# Patient Record
Sex: FEMALE | Race: ASIAN | NOT HISPANIC OR LATINO
[De-identification: names, ages, dates, MRNs, and addresses within clinical notes are randomized per-mention and may not be internally consistent; named-entity substitution may affect disease eponyms.]

---

## 2017-01-10 ENCOUNTER — APPOINTMENT (OUTPATIENT)
Dept: ALLERGY | Facility: CLINIC | Age: 37
End: 2017-01-10

## 2017-01-10 VITALS
SYSTOLIC BLOOD PRESSURE: 110 MMHG | DIASTOLIC BLOOD PRESSURE: 74 MMHG | HEIGHT: 64 IN | WEIGHT: 170 LBS | BODY MASS INDEX: 29.02 KG/M2 | HEART RATE: 72 BPM | RESPIRATION RATE: 14 BRPM

## 2017-02-06 ENCOUNTER — APPOINTMENT (OUTPATIENT)
Dept: ALLERGY | Facility: CLINIC | Age: 37
End: 2017-02-06

## 2017-02-06 RX ORDER — CETIRIZINE HCL 10 MG
10 TABLET ORAL
Refills: 0 | Status: DISCONTINUED | COMMUNITY
End: 2017-02-06

## 2017-03-07 ENCOUNTER — MESSAGE (OUTPATIENT)
Age: 37
End: 2017-03-07

## 2017-03-13 ENCOUNTER — APPOINTMENT (OUTPATIENT)
Dept: DERMATOLOGY | Facility: CLINIC | Age: 37
End: 2017-03-13

## 2017-03-13 VITALS — DIASTOLIC BLOOD PRESSURE: 60 MMHG | SYSTOLIC BLOOD PRESSURE: 100 MMHG

## 2017-03-13 RX ORDER — RANITIDINE HYDROCHLORIDE 150 MG/1
150 TABLET, FILM COATED ORAL
Refills: 0 | Status: COMPLETED | COMMUNITY
End: 2017-03-13

## 2017-03-20 ENCOUNTER — APPOINTMENT (OUTPATIENT)
Dept: ALLERGY | Facility: CLINIC | Age: 37
End: 2017-03-20

## 2017-03-24 ENCOUNTER — APPOINTMENT (OUTPATIENT)
Dept: INTERNAL MEDICINE | Facility: CLINIC | Age: 37
End: 2017-03-24

## 2017-03-24 VITALS
HEART RATE: 101 BPM | SYSTOLIC BLOOD PRESSURE: 100 MMHG | HEIGHT: 64 IN | BODY MASS INDEX: 29.53 KG/M2 | OXYGEN SATURATION: 98 % | DIASTOLIC BLOOD PRESSURE: 60 MMHG | WEIGHT: 173 LBS | TEMPERATURE: 98.5 F

## 2017-03-27 ENCOUNTER — APPOINTMENT (OUTPATIENT)
Dept: INTERNAL MEDICINE | Facility: CLINIC | Age: 37
End: 2017-03-27

## 2017-03-27 VITALS
OXYGEN SATURATION: 98 % | BODY MASS INDEX: 29.53 KG/M2 | TEMPERATURE: 98.6 F | WEIGHT: 173 LBS | HEART RATE: 109 BPM | DIASTOLIC BLOOD PRESSURE: 70 MMHG | SYSTOLIC BLOOD PRESSURE: 114 MMHG | HEIGHT: 64 IN

## 2017-03-27 LAB — S PYO AG SPEC QL IA: POSITIVE

## 2017-03-28 LAB
ALBUMIN SERPL ELPH-MCNC: 4.3 G/DL
ALP BLD-CCNC: 67 U/L
ALT SERPL-CCNC: 15 U/L
ANION GAP SERPL CALC-SCNC: 14 MMOL/L
AST SERPL-CCNC: 17 U/L
BASOPHILS # BLD AUTO: 0.03 K/UL
BASOPHILS NFR BLD AUTO: 0.3 %
BILIRUB SERPL-MCNC: 0.3 MG/DL
BUN SERPL-MCNC: 14 MG/DL
CALCIUM SERPL-MCNC: 10.3 MG/DL
CHLORIDE SERPL-SCNC: 102 MMOL/L
CHOLEST SERPL-MCNC: 175 MG/DL
CHOLEST/HDLC SERPL: 4.4 RATIO
CO2 SERPL-SCNC: 24 MMOL/L
CREAT SERPL-MCNC: 0.79 MG/DL
EOSINOPHIL # BLD AUTO: 0.04 K/UL
EOSINOPHIL NFR BLD AUTO: 0.5 %
GLUCOSE SERPL-MCNC: 95 MG/DL
HBA1C MFR BLD HPLC: 5.9 %
HCT VFR BLD CALC: 36.6 %
HDLC SERPL-MCNC: 40 MG/DL
HGB BLD-MCNC: 11.8 G/DL
IMM GRANULOCYTES NFR BLD AUTO: 0.1 %
LDLC SERPL CALC-MCNC: 86 MG/DL
LYMPHOCYTES # BLD AUTO: 2.3 K/UL
LYMPHOCYTES NFR BLD AUTO: 26.2 %
MAN DIFF?: NORMAL
MCHC RBC-ENTMCNC: 27.6 PG
MCHC RBC-ENTMCNC: 32.2 GM/DL
MCV RBC AUTO: 85.5 FL
MONOCYTES # BLD AUTO: 0.45 K/UL
MONOCYTES NFR BLD AUTO: 5.1 %
NEUTROPHILS # BLD AUTO: 5.94 K/UL
NEUTROPHILS NFR BLD AUTO: 67.8 %
PLATELET # BLD AUTO: 278 K/UL
POTASSIUM SERPL-SCNC: 4.6 MMOL/L
PROT SERPL-MCNC: 7.3 G/DL
RBC # BLD: 4.28 M/UL
RBC # FLD: 15.5 %
SODIUM SERPL-SCNC: 140 MMOL/L
TRIGL SERPL-MCNC: 245 MG/DL
TSH SERPL-ACNC: 1.52 UIU/ML
WBC # FLD AUTO: 8.77 K/UL

## 2017-04-11 ENCOUNTER — APPOINTMENT (OUTPATIENT)
Dept: INTERNAL MEDICINE | Facility: CLINIC | Age: 37
End: 2017-04-11

## 2017-04-11 VITALS
DIASTOLIC BLOOD PRESSURE: 80 MMHG | SYSTOLIC BLOOD PRESSURE: 122 MMHG | BODY MASS INDEX: 29.53 KG/M2 | HEART RATE: 92 BPM | OXYGEN SATURATION: 98 % | HEIGHT: 64 IN | TEMPERATURE: 98.1 F | WEIGHT: 173 LBS

## 2017-04-11 RX ORDER — AZITHROMYCIN 250 MG/1
250 TABLET, FILM COATED ORAL
Qty: 1 | Refills: 0 | Status: DISCONTINUED | COMMUNITY
Start: 2017-03-27 | End: 2017-04-11

## 2017-04-13 LAB — BACTERIA THROAT CULT: NORMAL

## 2017-04-19 ENCOUNTER — APPOINTMENT (OUTPATIENT)
Dept: OBGYN | Facility: CLINIC | Age: 37
End: 2017-04-19

## 2017-04-19 VITALS
SYSTOLIC BLOOD PRESSURE: 111 MMHG | WEIGHT: 174 LBS | BODY MASS INDEX: 29.71 KG/M2 | DIASTOLIC BLOOD PRESSURE: 70 MMHG | HEART RATE: 82 BPM | HEIGHT: 64 IN

## 2017-04-21 ENCOUNTER — OTHER (OUTPATIENT)
Age: 37
End: 2017-04-21

## 2017-04-26 ENCOUNTER — OUTPATIENT (OUTPATIENT)
Dept: OUTPATIENT SERVICES | Facility: HOSPITAL | Age: 37
LOS: 1 days | End: 2017-04-26
Payer: COMMERCIAL

## 2017-04-26 ENCOUNTER — APPOINTMENT (OUTPATIENT)
Dept: ULTRASOUND IMAGING | Facility: CLINIC | Age: 37
End: 2017-04-26

## 2017-04-26 ENCOUNTER — APPOINTMENT (OUTPATIENT)
Dept: MAMMOGRAPHY | Facility: CLINIC | Age: 37
End: 2017-04-26

## 2017-04-26 DIAGNOSIS — Z98.89 OTHER SPECIFIED POSTPROCEDURAL STATES: Chronic | ICD-10-CM

## 2017-04-26 DIAGNOSIS — R10.32 LEFT LOWER QUADRANT PAIN: ICD-10-CM

## 2017-04-26 DIAGNOSIS — Z01.419 ENCOUNTER FOR GYNECOLOGICAL EXAMINATION (GENERAL) (ROUTINE) WITHOUT ABNORMAL FINDINGS: ICD-10-CM

## 2017-04-26 PROCEDURE — 77066 DX MAMMO INCL CAD BI: CPT

## 2017-04-26 PROCEDURE — 76830 TRANSVAGINAL US NON-OB: CPT

## 2017-04-26 PROCEDURE — 76856 US EXAM PELVIC COMPLETE: CPT

## 2017-04-26 PROCEDURE — 76642 ULTRASOUND BREAST LIMITED: CPT

## 2017-04-26 PROCEDURE — G0279: CPT

## 2017-05-03 ENCOUNTER — OTHER (OUTPATIENT)
Age: 37
End: 2017-05-03

## 2017-05-08 ENCOUNTER — APPOINTMENT (OUTPATIENT)
Dept: DERMATOLOGY | Facility: CLINIC | Age: 37
End: 2017-05-08

## 2017-05-09 ENCOUNTER — OUTPATIENT (OUTPATIENT)
Dept: OUTPATIENT SERVICES | Facility: HOSPITAL | Age: 37
LOS: 1 days | End: 2017-05-09
Payer: COMMERCIAL

## 2017-05-09 ENCOUNTER — APPOINTMENT (OUTPATIENT)
Dept: MAMMOGRAPHY | Facility: CLINIC | Age: 37
End: 2017-05-09

## 2017-05-09 DIAGNOSIS — Z98.89 OTHER SPECIFIED POSTPROCEDURAL STATES: Chronic | ICD-10-CM

## 2017-05-09 DIAGNOSIS — R92.8 OTHER ABNORMAL AND INCONCLUSIVE FINDINGS ON DIAGNOSTIC IMAGING OF BREAST: ICD-10-CM

## 2017-05-09 PROCEDURE — 19081 BX BREAST 1ST LESION STRTCTC: CPT

## 2017-05-10 ENCOUNTER — CLINICAL ADVICE (OUTPATIENT)
Age: 37
End: 2017-05-10

## 2017-05-11 ENCOUNTER — APPOINTMENT (OUTPATIENT)
Dept: DERMATOLOGY | Facility: CLINIC | Age: 37
End: 2017-05-11

## 2017-05-11 VITALS — SYSTOLIC BLOOD PRESSURE: 110 MMHG | DIASTOLIC BLOOD PRESSURE: 58 MMHG

## 2017-06-21 ENCOUNTER — APPOINTMENT (OUTPATIENT)
Dept: INTERNAL MEDICINE | Facility: CLINIC | Age: 37
End: 2017-06-21

## 2017-06-21 ENCOUNTER — TRANSCRIPTION ENCOUNTER (OUTPATIENT)
Age: 37
End: 2017-06-21

## 2017-06-21 VITALS
WEIGHT: 170 LBS | HEIGHT: 64 IN | TEMPERATURE: 99 F | DIASTOLIC BLOOD PRESSURE: 70 MMHG | SYSTOLIC BLOOD PRESSURE: 120 MMHG | OXYGEN SATURATION: 98 % | HEART RATE: 81 BPM | BODY MASS INDEX: 29.02 KG/M2

## 2017-06-23 ENCOUNTER — APPOINTMENT (OUTPATIENT)
Dept: INTERNAL MEDICINE | Facility: CLINIC | Age: 37
End: 2017-06-23

## 2017-06-23 VITALS
WEIGHT: 170 LBS | SYSTOLIC BLOOD PRESSURE: 112 MMHG | DIASTOLIC BLOOD PRESSURE: 70 MMHG | HEART RATE: 77 BPM | OXYGEN SATURATION: 99 % | HEIGHT: 64 IN | BODY MASS INDEX: 29.02 KG/M2 | TEMPERATURE: 98 F

## 2017-06-23 VITALS — SYSTOLIC BLOOD PRESSURE: 140 MMHG | DIASTOLIC BLOOD PRESSURE: 88 MMHG

## 2017-06-23 RX ORDER — CEPHALEXIN 500 MG/1
500 TABLET ORAL
Qty: 20 | Refills: 0 | Status: DISCONTINUED | COMMUNITY
Start: 2017-06-21 | End: 2017-06-23

## 2017-06-23 RX ORDER — CEPHALEXIN 500 MG/1
500 CAPSULE ORAL
Qty: 20 | Refills: 0 | Status: DISCONTINUED | COMMUNITY
Start: 2017-06-21

## 2017-06-23 RX ORDER — METRONIDAZOLE 500 MG/1
500 TABLET ORAL
Qty: 21 | Refills: 0 | Status: DISCONTINUED | COMMUNITY
Start: 2017-06-22

## 2017-06-30 ENCOUNTER — APPOINTMENT (OUTPATIENT)
Dept: INTERNAL MEDICINE | Facility: CLINIC | Age: 37
End: 2017-06-30

## 2017-06-30 VITALS
SYSTOLIC BLOOD PRESSURE: 110 MMHG | WEIGHT: 170 LBS | HEIGHT: 64 IN | OXYGEN SATURATION: 97 % | HEART RATE: 83 BPM | TEMPERATURE: 97.9 F | DIASTOLIC BLOOD PRESSURE: 64 MMHG | BODY MASS INDEX: 29.02 KG/M2

## 2017-07-05 LAB — BACTERIA THROAT CULT: NORMAL

## 2017-08-25 ENCOUNTER — APPOINTMENT (OUTPATIENT)
Dept: OBGYN | Facility: CLINIC | Age: 37
End: 2017-08-25
Payer: COMMERCIAL

## 2017-08-25 LAB
BILIRUB UR QL STRIP: NEGATIVE
CLARITY UR: CLEAR
COLLECTION METHOD: NORMAL
GLUCOSE UR-MCNC: NEGATIVE
HCG UR QL: 0.2 EU/DL
HGB UR QL STRIP.AUTO: NORMAL
KETONES UR-MCNC: NEGATIVE
LEUKOCYTE ESTERASE UR QL STRIP: NEGATIVE
NITRITE UR QL STRIP: NEGATIVE
PH UR STRIP: 6
PROT UR STRIP-MCNC: NEGATIVE
SP GR UR STRIP: 1

## 2017-08-25 PROCEDURE — 99211 OFF/OP EST MAY X REQ PHY/QHP: CPT

## 2017-08-25 PROCEDURE — 81003 URINALYSIS AUTO W/O SCOPE: CPT | Mod: QW

## 2017-08-27 LAB — BACTERIA UR CULT: NORMAL

## 2017-08-28 ENCOUNTER — APPOINTMENT (OUTPATIENT)
Dept: OBGYN | Facility: CLINIC | Age: 37
End: 2017-08-28
Payer: COMMERCIAL

## 2017-08-28 PROCEDURE — 76830 TRANSVAGINAL US NON-OB: CPT

## 2017-12-04 ENCOUNTER — APPOINTMENT (OUTPATIENT)
Dept: OBGYN | Facility: CLINIC | Age: 37
End: 2017-12-04
Payer: COMMERCIAL

## 2017-12-04 ENCOUNTER — APPOINTMENT (OUTPATIENT)
Dept: OBGYN | Facility: CLINIC | Age: 37
End: 2017-12-04

## 2017-12-04 ENCOUNTER — ASOB RESULT (OUTPATIENT)
Age: 37
End: 2017-12-04

## 2017-12-04 VITALS
DIASTOLIC BLOOD PRESSURE: 77 MMHG | WEIGHT: 168 LBS | HEART RATE: 90 BPM | SYSTOLIC BLOOD PRESSURE: 122 MMHG | HEIGHT: 64 IN | BODY MASS INDEX: 28.68 KG/M2

## 2017-12-04 PROCEDURE — 99214 OFFICE O/P EST MOD 30 MIN: CPT

## 2017-12-04 PROCEDURE — 76830 TRANSVAGINAL US NON-OB: CPT

## 2017-12-04 RX ORDER — DAPSONE 75 MG/G
7.5 GEL TOPICAL
Qty: 1 | Refills: 1 | Status: DISCONTINUED | COMMUNITY
Start: 2017-03-13 | End: 2017-12-04

## 2017-12-04 RX ORDER — ADAPALENE AND BENZOYL PEROXIDE 1; 25 MG/G; MG/G
0.1-2.5 GEL TOPICAL
Qty: 1 | Refills: 5 | Status: DISCONTINUED | COMMUNITY
Start: 2017-03-13 | End: 2017-12-04

## 2017-12-06 ENCOUNTER — OTHER (OUTPATIENT)
Age: 37
End: 2017-12-06

## 2017-12-06 LAB
BACTERIA UR CULT: NORMAL
C TRACH RRNA SPEC QL NAA+PROBE: NOT DETECTED
CANDIDA VAG CYTO: NOT DETECTED
G VAGINALIS+PREV SP MTYP VAG QL MICRO: NOT DETECTED
N GONORRHOEA RRNA SPEC QL NAA+PROBE: NOT DETECTED
SOURCE AMPLIFICATION: NORMAL
T VAGINALIS VAG QL WET PREP: NOT DETECTED

## 2017-12-13 ENCOUNTER — APPOINTMENT (OUTPATIENT)
Dept: INTERNAL MEDICINE | Facility: CLINIC | Age: 37
End: 2017-12-13

## 2018-01-22 ENCOUNTER — APPOINTMENT (OUTPATIENT)
Dept: INTERNAL MEDICINE | Facility: CLINIC | Age: 38
End: 2018-01-22
Payer: COMMERCIAL

## 2018-01-22 VITALS
SYSTOLIC BLOOD PRESSURE: 115 MMHG | HEIGHT: 64 IN | OXYGEN SATURATION: 97 % | WEIGHT: 170 LBS | TEMPERATURE: 97.5 F | BODY MASS INDEX: 29.02 KG/M2 | DIASTOLIC BLOOD PRESSURE: 70 MMHG | HEART RATE: 74 BPM

## 2018-01-22 PROCEDURE — 99213 OFFICE O/P EST LOW 20 MIN: CPT

## 2018-02-01 ENCOUNTER — APPOINTMENT (OUTPATIENT)
Dept: INTERNAL MEDICINE | Facility: CLINIC | Age: 38
End: 2018-02-01
Payer: COMMERCIAL

## 2018-02-01 ENCOUNTER — NON-APPOINTMENT (OUTPATIENT)
Age: 38
End: 2018-02-01

## 2018-02-01 VITALS
HEART RATE: 86 BPM | SYSTOLIC BLOOD PRESSURE: 114 MMHG | WEIGHT: 170 LBS | DIASTOLIC BLOOD PRESSURE: 66 MMHG | OXYGEN SATURATION: 98 % | HEIGHT: 64 IN | BODY MASS INDEX: 29.02 KG/M2 | TEMPERATURE: 97.7 F

## 2018-02-01 DIAGNOSIS — Z82.49 FAMILY HISTORY OF ISCHEMIC HEART DISEASE AND OTHER DISEASES OF THE CIRCULATORY SYSTEM: ICD-10-CM

## 2018-02-01 DIAGNOSIS — Z83.3 FAMILY HISTORY OF DIABETES MELLITUS: ICD-10-CM

## 2018-02-01 PROCEDURE — 93000 ELECTROCARDIOGRAM COMPLETE: CPT

## 2018-02-01 PROCEDURE — 99213 OFFICE O/P EST LOW 20 MIN: CPT | Mod: 25

## 2018-02-02 ENCOUNTER — APPOINTMENT (OUTPATIENT)
Dept: PULMONOLOGY | Facility: CLINIC | Age: 38
End: 2018-02-02
Payer: COMMERCIAL

## 2018-02-02 PROCEDURE — 94060 EVALUATION OF WHEEZING: CPT

## 2018-02-02 PROCEDURE — 94726 PLETHYSMOGRAPHY LUNG VOLUMES: CPT

## 2018-02-02 PROCEDURE — 94729 DIFFUSING CAPACITY: CPT

## 2018-04-04 ENCOUNTER — EMERGENCY (EMERGENCY)
Facility: HOSPITAL | Age: 38
LOS: 1 days | Discharge: HOME | End: 2018-04-04
Attending: EMERGENCY MEDICINE | Admitting: EMERGENCY MEDICINE
Payer: COMMERCIAL

## 2018-04-04 VITALS
SYSTOLIC BLOOD PRESSURE: 113 MMHG | DIASTOLIC BLOOD PRESSURE: 65 MMHG | HEART RATE: 90 BPM | TEMPERATURE: 98 F | RESPIRATION RATE: 16 BRPM | OXYGEN SATURATION: 100 %

## 2018-04-04 DIAGNOSIS — Z98.89 OTHER SPECIFIED POSTPROCEDURAL STATES: Chronic | ICD-10-CM

## 2018-04-04 PROCEDURE — 99283 EMERGENCY DEPT VISIT LOW MDM: CPT | Mod: 25

## 2018-04-04 PROCEDURE — 93010 ELECTROCARDIOGRAM REPORT: CPT

## 2018-04-04 NOTE — ED ADULT TRIAGE NOTE - CHIEF COMPLAINT QUOTE
C/o epigastric pain radiating up to chest x 30 mins after eating dinner. Denies N/V/D. Denies medical hx.

## 2018-04-05 VITALS
HEART RATE: 78 BPM | OXYGEN SATURATION: 100 % | DIASTOLIC BLOOD PRESSURE: 60 MMHG | SYSTOLIC BLOOD PRESSURE: 103 MMHG | RESPIRATION RATE: 15 BRPM

## 2018-04-05 RX ORDER — FAMOTIDINE 10 MG/ML
20 INJECTION INTRAVENOUS ONCE
Qty: 0 | Refills: 0 | Status: DISCONTINUED | OUTPATIENT
Start: 2018-04-05 | End: 2018-04-05

## 2018-04-05 RX ORDER — FAMOTIDINE 10 MG/ML
40 INJECTION INTRAVENOUS ONCE
Qty: 0 | Refills: 0 | Status: COMPLETED | OUTPATIENT
Start: 2018-04-05 | End: 2018-04-05

## 2018-04-05 RX ADMIN — Medication 30 MILLILITER(S): at 02:20

## 2018-04-05 RX ADMIN — FAMOTIDINE 40 MILLIGRAM(S): 10 INJECTION INTRAVENOUS at 02:20

## 2018-04-05 NOTE — ED PROVIDER NOTE - MEDICAL DECISION MAKING DETAILS
37 y.o female w/ no pmhx presenting w/ epigastric pain since 9pm after eating goat mancuso. differential: most likely GERD, symptoms resolved. will give pepcid and maalox, obtain CXR, unlikely cardiac. HEART Score: 0

## 2018-04-05 NOTE — ED PROVIDER NOTE - ATTENDING CONTRIBUTION TO CARE
DR. PEDROZA, ATTENDING MD-  I performed a face to face bedside interview with patient regarding history of present illness, review of symptoms and past medical history. I completed an independent physical exam.  I have discussed patient's plan of care with the resident.   Documentation as above in the note.   HPI: 37 y.o female w/ no pmhx presenting w/ epigastric pain since 9pm after eating goat mancuso. Pain is 9/10 sharp, constant, radiated to back, w/ mild nausea and SOB.  gave her two rolaids w/ no relief. Denies CP, palpitations, fevers/chills, palpitations, abdominal pain, changes in bowel habits. All other ROS is negative. Currently, symptoms have resolved w/ no intervention.   EXAM: Well appearing, NAD, abd soft nontende,r no rebound, no guarding, Neg murphys, neg mcburneys.   MDM: Concern is for GERD, unlikely other abd pathology given story and exam. pain improved without intervention prior to arrival. Will provide meds, rec to lifestyle change, f/u with PMD and her private GI MD. Return for worsening symptoms.

## 2018-04-05 NOTE — ED ADULT NURSE NOTE - OBJECTIVE STATEMENT
pt a&o x3 c/o epigastric pain today after eating. denies n/v/d. nad noted. md at bedside. medicated as ordered.

## 2018-04-05 NOTE — ED PROVIDER NOTE - OBJECTIVE STATEMENT
37 y.o female w/ no pmhx 37 y.o female w/ no pmhx presenting w/ epigastric pain since 9pm after eating goat mancuso. Pain is 9/10 sharp, constant, radiated to back, w/ mild nausea and SOB.  gave her two rolaids w/ no relief. Denies CP, palpitations, fevers/chills, palpitations, abdominal pain, changes in bowel habits. All other ROS is negative. Currently, symptoms have resolved w/ no intervention. No smoking hx. Family hx + for dad w/ CAD age 60s. Currently no medications

## 2018-05-02 ENCOUNTER — ASOB RESULT (OUTPATIENT)
Age: 38
End: 2018-05-02

## 2018-05-02 ENCOUNTER — APPOINTMENT (OUTPATIENT)
Dept: OBGYN | Facility: CLINIC | Age: 38
End: 2018-05-02
Payer: COMMERCIAL

## 2018-05-02 VITALS
BODY MASS INDEX: 28.51 KG/M2 | HEART RATE: 76 BPM | WEIGHT: 167 LBS | SYSTOLIC BLOOD PRESSURE: 110 MMHG | HEIGHT: 64 IN | DIASTOLIC BLOOD PRESSURE: 75 MMHG

## 2018-05-02 PROCEDURE — 76830 TRANSVAGINAL US NON-OB: CPT

## 2018-05-02 PROCEDURE — 99213 OFFICE O/P EST LOW 20 MIN: CPT

## 2018-05-10 ENCOUNTER — OTHER (OUTPATIENT)
Age: 38
End: 2018-05-10

## 2018-05-25 ENCOUNTER — APPOINTMENT (OUTPATIENT)
Dept: INTERNAL MEDICINE | Facility: CLINIC | Age: 38
End: 2018-05-25
Payer: COMMERCIAL

## 2018-05-25 VITALS
HEART RATE: 81 BPM | BODY MASS INDEX: 28.68 KG/M2 | SYSTOLIC BLOOD PRESSURE: 120 MMHG | TEMPERATURE: 98.2 F | WEIGHT: 168 LBS | HEIGHT: 64 IN | DIASTOLIC BLOOD PRESSURE: 70 MMHG | OXYGEN SATURATION: 98 %

## 2018-05-25 PROCEDURE — 99395 PREV VISIT EST AGE 18-39: CPT

## 2018-05-25 RX ORDER — OSELTAMIVIR PHOSPHATE 75 MG/1
75 CAPSULE ORAL
Qty: 10 | Refills: 0 | Status: COMPLETED | COMMUNITY
Start: 2018-02-11

## 2018-05-25 NOTE — HISTORY OF PRESENT ILLNESS
[FreeTextEntry1] : Here for CPE [de-identified] : Continues to have dizziness, saw ENT, recommended balance training, hasn't gone yet.  no syncope. no falls.  \par \par Reports daily heartburn, light, but there.  Had a very bad epsidoe in APril, went to [facility], but pain resolved before she was seen (had taken rolaids).  \par \par No allergic reactions.  \par

## 2018-05-25 NOTE — PHYSICAL EXAM
[No Acute Distress] : no acute distress [Well-Appearing] : well-appearing [Normal Sclera/Conjunctiva] : normal sclera/conjunctiva [Normal Outer Ear/Nose] : the outer ears and nose were normal in appearance [Normal Oropharynx] : the oropharynx was normal [Supple] : supple [No Respiratory Distress] : no respiratory distress  [Clear to Auscultation] : lungs were clear to auscultation bilaterally [Normal Rate] : normal rate  [Regular Rhythm] : with a regular rhythm [Normal S1, S2] : normal S1 and S2 [No Murmur] : no murmur heard [No Edema] : there was no peripheral edema [Soft] : abdomen soft [Non Tender] : non-tender

## 2018-05-25 NOTE — ASSESSMENT
[FreeTextEntry1] : 36 y/o female for CPE\par \par Chronic dizziness: negative workup, follow up for vestibular therapy as recommended by ENT\par -check CBC for anemia\par \par HCM: up to date with GYN\par labs as noted\par \par Heartburn: advised antacids prn, small regular meals.

## 2018-06-01 ENCOUNTER — MED ADMIN CHARGE (OUTPATIENT)
Age: 38
End: 2018-06-01

## 2018-06-01 RX ORDER — FLUCONAZOLE 150 MG/1
150 TABLET ORAL
Qty: 1 | Refills: 0 | Status: DISCONTINUED | COMMUNITY
Start: 2018-06-01 | End: 2018-06-01

## 2018-06-14 LAB
ALBUMIN SERPL ELPH-MCNC: 4.4 G/DL
ALP BLD-CCNC: 58 U/L
ALT SERPL-CCNC: 15 U/L
ANION GAP SERPL CALC-SCNC: 10 MMOL/L
AST SERPL-CCNC: 18 U/L
BASOPHILS # BLD AUTO: 0.02 K/UL
BASOPHILS NFR BLD AUTO: 0.4 %
BILIRUB SERPL-MCNC: 0.6 MG/DL
BUN SERPL-MCNC: 10 MG/DL
CALCIUM SERPL-MCNC: 9.9 MG/DL
CHLORIDE SERPL-SCNC: 103 MMOL/L
CHOLEST SERPL-MCNC: 171 MG/DL
CHOLEST/HDLC SERPL: 3.4 RATIO
CO2 SERPL-SCNC: 27 MMOL/L
CREAT SERPL-MCNC: 0.85 MG/DL
EOSINOPHIL # BLD AUTO: 0.02 K/UL
EOSINOPHIL NFR BLD AUTO: 0.4 %
GLUCOSE SERPL-MCNC: 97 MG/DL
HBA1C MFR BLD HPLC: 5.7 %
HCT VFR BLD CALC: 37.7 %
HDLC SERPL-MCNC: 50 MG/DL
HGB BLD-MCNC: 12.4 G/DL
IMM GRANULOCYTES NFR BLD AUTO: 0.2 %
LDLC SERPL CALC-MCNC: 104 MG/DL
LYMPHOCYTES # BLD AUTO: 1.85 K/UL
LYMPHOCYTES NFR BLD AUTO: 33 %
MAN DIFF?: NORMAL
MCHC RBC-ENTMCNC: 28.6 PG
MCHC RBC-ENTMCNC: 32.9 GM/DL
MCV RBC AUTO: 87.1 FL
MONOCYTES # BLD AUTO: 0.51 K/UL
MONOCYTES NFR BLD AUTO: 9.1 %
NEUTROPHILS # BLD AUTO: 3.19 K/UL
NEUTROPHILS NFR BLD AUTO: 56.9 %
PLATELET # BLD AUTO: 256 K/UL
POTASSIUM SERPL-SCNC: 4.2 MMOL/L
PROT SERPL-MCNC: 7.3 G/DL
RBC # BLD: 4.33 M/UL
RBC # FLD: 13.6 %
SODIUM SERPL-SCNC: 140 MMOL/L
TRIGL SERPL-MCNC: 83 MG/DL
TSH SERPL-ACNC: 1.8 UIU/ML
WBC # FLD AUTO: 5.6 K/UL

## 2018-06-22 ENCOUNTER — OTHER (OUTPATIENT)
Age: 38
End: 2018-06-22

## 2018-08-15 ENCOUNTER — APPOINTMENT (OUTPATIENT)
Dept: INTERNAL MEDICINE | Facility: CLINIC | Age: 38
End: 2018-08-15
Payer: COMMERCIAL

## 2018-08-15 VITALS
SYSTOLIC BLOOD PRESSURE: 116 MMHG | BODY MASS INDEX: 28.34 KG/M2 | HEIGHT: 64 IN | HEART RATE: 90 BPM | WEIGHT: 166 LBS | OXYGEN SATURATION: 98 % | DIASTOLIC BLOOD PRESSURE: 72 MMHG | TEMPERATURE: 98.5 F

## 2018-08-15 DIAGNOSIS — K52.1 TOXIC GASTROENTERITIS AND COLITIS: ICD-10-CM

## 2018-08-15 DIAGNOSIS — K58.1 IRRITABLE BOWEL SYNDROME WITH CONSTIPATION: ICD-10-CM

## 2018-08-15 DIAGNOSIS — Z30.09 ENCOUNTER FOR OTHER GENERAL COUNSELING AND ADVICE ON CONTRACEPTION: ICD-10-CM

## 2018-08-15 DIAGNOSIS — M54.42 LUMBAGO WITH SCIATICA, LEFT SIDE: ICD-10-CM

## 2018-08-15 DIAGNOSIS — Z86.59 PERSONAL HISTORY OF OTHER MENTAL AND BEHAVIORAL DISORDERS: ICD-10-CM

## 2018-08-15 DIAGNOSIS — J02.0 STREPTOCOCCAL PHARYNGITIS: ICD-10-CM

## 2018-08-15 DIAGNOSIS — K58.0 IRRITABLE BOWEL SYNDROME WITH DIARRHEA: ICD-10-CM

## 2018-08-15 DIAGNOSIS — M22.41 CHONDROMALACIA PATELLAE, RIGHT KNEE: ICD-10-CM

## 2018-08-15 DIAGNOSIS — R21 RASH AND OTHER NONSPECIFIC SKIN ERUPTION: ICD-10-CM

## 2018-08-15 DIAGNOSIS — D50.0 IRON DEFICIENCY ANEMIA SECONDARY TO BLOOD LOSS (CHRONIC): ICD-10-CM

## 2018-08-15 DIAGNOSIS — R10.2 PELVIC AND PERINEAL PAIN: ICD-10-CM

## 2018-08-15 DIAGNOSIS — Z72.51 HIGH RISK HETEROSEXUAL BEHAVIOR: ICD-10-CM

## 2018-08-15 DIAGNOSIS — Z87.2 PERSONAL HISTORY OF DISEASES OF THE SKIN AND SUBCUTANEOUS TISSUE: ICD-10-CM

## 2018-08-15 DIAGNOSIS — R10.32 LEFT LOWER QUADRANT PAIN: ICD-10-CM

## 2018-08-15 DIAGNOSIS — R14.3 FLATULENCE: ICD-10-CM

## 2018-08-15 DIAGNOSIS — K13.0 DISEASES OF LIPS: ICD-10-CM

## 2018-08-15 DIAGNOSIS — F41.9 ANXIETY DISORDER, UNSPECIFIED: ICD-10-CM

## 2018-08-15 PROCEDURE — 99213 OFFICE O/P EST LOW 20 MIN: CPT

## 2018-08-15 NOTE — HISTORY OF PRESENT ILLNESS
[FreeTextEntry8] : Reports she had lost 10 lbs in one week about 2 weeks.\par \amy Had travelled to [facility] for 3 weeks.  Family told her she looked pale and that she looked tired.  was weighed at gyn office at 155 and became concerend that she had lost 10lbs in one week. .  home scale is very variable 166, 165.  no change.   does not note any changes.  Fatgiue is ongoing, possibly worse.  Dizziness continues, extensive workup.  Reports her BP was low at the chiropractor 70's/50s?\par \par No change here in weight.  BP normal.  \par \amy Had labs in June 14 2018. nomral\par \amy Has a lot of anxiety/stress lately.mother in law just had brain surgery.\par son to start school this fall which will give her some free time.  has no time for herself currently.\par \par RENAY 7 is 14. Is NOT interested in medication.  plans to start yoga when her child starts school.  \par

## 2018-08-15 NOTE — REVIEW OF SYSTEMS
[Fever] : no fever [Fatigue] : fatigue [Recent Change In Weight] : ~T no recent weight change [Shortness Of Breath] : shortness of breath [FreeTextEntry6] : when very stressed.

## 2018-08-15 NOTE — ASSESSMENT
[FreeTextEntry1] : 37 y/o female for f/i visit after concern for acute weight loss (not verified by home or office scale), thought weight is stable.  Ongoing fatigue despite normal labs. does have anxiety (stressors and little time for herself) which may be exacerbating her fatigue.\par -reassured patient re: stable weight, recent normal labs\par -RENAY 7 and hx indicative of anxiety, however, may improve with youngest child entering school in sept.  advised self care, focus on healthy amounts of sleep and stress reduction such as yoga.\par

## 2018-08-15 NOTE — PHYSICAL EXAM
[No Acute Distress] : no acute distress [Well-Appearing] : well-appearing [Normal Sclera/Conjunctiva] : normal sclera/conjunctiva [No Respiratory Distress] : no respiratory distress  [Clear to Auscultation] : lungs were clear to auscultation bilaterally [Normal Rate] : normal rate  [Regular Rhythm] : with a regular rhythm

## 2018-09-12 ENCOUNTER — APPOINTMENT (OUTPATIENT)
Dept: OBGYN | Facility: CLINIC | Age: 38
End: 2018-09-12
Payer: COMMERCIAL

## 2018-09-12 VITALS
SYSTOLIC BLOOD PRESSURE: 105 MMHG | HEART RATE: 80 BPM | HEIGHT: 64 IN | DIASTOLIC BLOOD PRESSURE: 68 MMHG | WEIGHT: 165.7 LBS | BODY MASS INDEX: 28.29 KG/M2

## 2018-09-12 PROCEDURE — 99213 OFFICE O/P EST LOW 20 MIN: CPT

## 2018-09-12 RX ORDER — CLOTRIMAZOLE AND BETAMETHASONE DIPROPIONATE 10; .5 MG/G; MG/G
1-0.05 CREAM TOPICAL
Qty: 15 | Refills: 0 | Status: DISCONTINUED | COMMUNITY
Start: 2018-06-02 | End: 2018-09-12

## 2018-09-12 RX ORDER — TERCONAZOLE 8 MG/G
0.8 CREAM VAGINAL
Qty: 1 | Refills: 0 | Status: DISCONTINUED | COMMUNITY
Start: 2018-06-01 | End: 2018-09-12

## 2018-09-14 ENCOUNTER — OTHER (OUTPATIENT)
Age: 38
End: 2018-09-14

## 2018-09-14 LAB
CANDIDA VAG CYTO: DETECTED
G VAGINALIS+PREV SP MTYP VAG QL MICRO: NOT DETECTED
T VAGINALIS VAG QL WET PREP: NOT DETECTED

## 2018-09-21 ENCOUNTER — OTHER (OUTPATIENT)
Age: 38
End: 2018-09-21

## 2018-10-23 ENCOUNTER — OTHER (OUTPATIENT)
Age: 38
End: 2018-10-23

## 2018-11-26 ENCOUNTER — ASOB RESULT (OUTPATIENT)
Age: 38
End: 2018-11-26

## 2018-11-26 ENCOUNTER — APPOINTMENT (OUTPATIENT)
Dept: OBGYN | Facility: CLINIC | Age: 38
End: 2018-11-26
Payer: COMMERCIAL

## 2018-11-26 VITALS
DIASTOLIC BLOOD PRESSURE: 68 MMHG | SYSTOLIC BLOOD PRESSURE: 112 MMHG | BODY MASS INDEX: 28 KG/M2 | HEART RATE: 62 BPM | WEIGHT: 164 LBS | HEIGHT: 64 IN

## 2018-11-26 DIAGNOSIS — N94.0 MITTELSCHMERZ: ICD-10-CM

## 2018-11-26 PROCEDURE — 99214 OFFICE O/P EST MOD 30 MIN: CPT

## 2018-11-26 PROCEDURE — 76830 TRANSVAGINAL US NON-OB: CPT

## 2018-11-27 ENCOUNTER — APPOINTMENT (OUTPATIENT)
Dept: OBGYN | Facility: CLINIC | Age: 38
End: 2018-11-27

## 2018-11-30 RX ORDER — TERCONAZOLE 4 MG/G
0.4 CREAM VAGINAL
Qty: 1 | Refills: 0 | Status: DISCONTINUED | COMMUNITY
Start: 2018-09-21 | End: 2018-11-30

## 2018-11-30 RX ORDER — FLUCONAZOLE 150 MG/1
150 TABLET ORAL
Qty: 1 | Refills: 1 | Status: DISCONTINUED | COMMUNITY
Start: 2018-09-14 | End: 2018-11-30

## 2018-11-30 RX ORDER — NYSTATIN 100000 U/G
100000 OINTMENT TOPICAL 3 TIMES DAILY
Qty: 1 | Refills: 0 | Status: DISCONTINUED | COMMUNITY
Start: 2018-09-12 | End: 2018-11-30

## 2018-12-13 LAB
CANDIDA VAG CYTO: NOT DETECTED
G VAGINALIS+PREV SP MTYP VAG QL MICRO: NOT DETECTED
T VAGINALIS VAG QL WET PREP: NOT DETECTED

## 2018-12-18 ENCOUNTER — OTHER (OUTPATIENT)
Age: 38
End: 2018-12-18

## 2019-03-03 ENCOUNTER — TRANSCRIPTION ENCOUNTER (OUTPATIENT)
Age: 39
End: 2019-03-03

## 2019-04-30 ENCOUNTER — APPOINTMENT (OUTPATIENT)
Dept: OBGYN | Facility: CLINIC | Age: 39
End: 2019-04-30
Payer: COMMERCIAL

## 2019-04-30 ENCOUNTER — ASOB RESULT (OUTPATIENT)
Age: 39
End: 2019-04-30

## 2019-04-30 VITALS
HEART RATE: 88 BPM | DIASTOLIC BLOOD PRESSURE: 74 MMHG | HEIGHT: 64 IN | BODY MASS INDEX: 29.19 KG/M2 | SYSTOLIC BLOOD PRESSURE: 123 MMHG | WEIGHT: 171 LBS

## 2019-04-30 PROCEDURE — 76830 TRANSVAGINAL US NON-OB: CPT

## 2019-04-30 PROCEDURE — 99213 OFFICE O/P EST LOW 20 MIN: CPT

## 2019-05-07 ENCOUNTER — OTHER (OUTPATIENT)
Age: 39
End: 2019-05-07

## 2019-06-25 ENCOUNTER — OTHER (OUTPATIENT)
Age: 39
End: 2019-06-25

## 2019-07-10 NOTE — PHYSICAL EXAM
[Awake] : awake [Alert] : alert [Acute Distress] : no acute distress [LAD] : no lymphadenopathy [Thyroid Nodule] : no thyroid nodule [Goiter] : no goiter [Mass] : no breast mass [Nipple Discharge] : no nipple discharge [Axillary LAD] : no axillary lymphadenopathy [Soft] : soft [Tender] : non tender [Distended] : not distended [Normal] : uterus [No Bleeding] : there was no active vaginal bleeding [Uterine Adnexae] : were not tender and not enlarged

## 2019-10-09 ENCOUNTER — APPOINTMENT (OUTPATIENT)
Dept: INTERNAL MEDICINE | Facility: CLINIC | Age: 39
End: 2019-10-09
Payer: COMMERCIAL

## 2019-10-09 VITALS
DIASTOLIC BLOOD PRESSURE: 64 MMHG | TEMPERATURE: 98.7 F | SYSTOLIC BLOOD PRESSURE: 106 MMHG | BODY MASS INDEX: 29.37 KG/M2 | WEIGHT: 172 LBS | HEART RATE: 76 BPM | HEIGHT: 64 IN | OXYGEN SATURATION: 98 %

## 2019-10-09 LAB
ALBUMIN SERPL ELPH-MCNC: 5 G/DL
ALP BLD-CCNC: 56 U/L
ALT SERPL-CCNC: 14 U/L
ANION GAP SERPL CALC-SCNC: 11 MMOL/L
AST SERPL-CCNC: 19 U/L
BASOPHILS # BLD AUTO: 0.06 K/UL
BASOPHILS NFR BLD AUTO: 0.8 %
BILIRUB SERPL-MCNC: 0.7 MG/DL
BUN SERPL-MCNC: 9 MG/DL
CALCIUM SERPL-MCNC: 9.7 MG/DL
CHLORIDE SERPL-SCNC: 105 MMOL/L
CHOLEST SERPL-MCNC: 162 MG/DL
CHOLEST/HDLC SERPL: 3.4 RATIO
CO2 SERPL-SCNC: 24 MMOL/L
CREAT SERPL-MCNC: 0.71 MG/DL
EOSINOPHIL # BLD AUTO: 0.03 K/UL
EOSINOPHIL NFR BLD AUTO: 0.4 %
ESTIMATED AVERAGE GLUCOSE: 108 MG/DL
GLUCOSE SERPL-MCNC: 89 MG/DL
HBA1C MFR BLD HPLC: 5.4 %
HCT VFR BLD CALC: 39.1 %
HDLC SERPL-MCNC: 48 MG/DL
HGB BLD-MCNC: 12.5 G/DL
IMM GRANULOCYTES NFR BLD AUTO: 0.1 %
LDLC SERPL CALC-MCNC: 102 MG/DL
LYMPHOCYTES # BLD AUTO: 2.12 K/UL
LYMPHOCYTES NFR BLD AUTO: 28.7 %
MAN DIFF?: NORMAL
MCHC RBC-ENTMCNC: 28.7 PG
MCHC RBC-ENTMCNC: 32 GM/DL
MCV RBC AUTO: 89.9 FL
MONOCYTES # BLD AUTO: 0.75 K/UL
MONOCYTES NFR BLD AUTO: 10.1 %
NEUTROPHILS # BLD AUTO: 4.42 K/UL
NEUTROPHILS NFR BLD AUTO: 59.9 %
PLATELET # BLD AUTO: 267 K/UL
POTASSIUM SERPL-SCNC: 4.3 MMOL/L
PROT SERPL-MCNC: 7.4 G/DL
RBC # BLD: 4.35 M/UL
RBC # FLD: 13.3 %
SODIUM SERPL-SCNC: 140 MMOL/L
TRIGL SERPL-MCNC: 60 MG/DL
TSH SERPL-ACNC: 1.74 UIU/ML
WBC # FLD AUTO: 7.39 K/UL

## 2019-10-09 PROCEDURE — 36415 COLL VENOUS BLD VENIPUNCTURE: CPT

## 2019-10-09 PROCEDURE — 99213 OFFICE O/P EST LOW 20 MIN: CPT | Mod: 25

## 2019-10-09 RX ORDER — TRIAMCINOLONE ACETONIDE 1 MG/G
0.1 OINTMENT TOPICAL
Qty: 1 | Refills: 1 | Status: DISCONTINUED | COMMUNITY
Start: 2018-09-12 | End: 2019-10-09

## 2019-10-09 NOTE — PHYSICAL EXAM
[No Acute Distress] : no acute distress [Well-Appearing] : well-appearing [No Respiratory Distress] : no respiratory distress  [Clear to Auscultation] : lungs were clear to auscultation bilaterally [Normal Rate] : normal rate  [Regular Rhythm] : with a regular rhythm [Normal S1, S2] : normal S1 and S2 [Soft] : abdomen soft [Non Tender] : non-tender [No HSM] : no HSM [de-identified] : no suris

## 2019-10-09 NOTE — ASSESSMENT
[FreeTextEntry1] : Cholelithiasis: with symptoms this past monday and likely over the past year or so\par -has gen surg scheduled this week\par -check labs as noted\par -currently asymptomatic\par -avoid fatty foods\par \par Overweigth:  nutrition eval

## 2019-10-09 NOTE — HISTORY OF PRESENT ILLNESS
[FreeTextEntry1] : Gallstones [de-identified] : Reports she has been having intermittent diarrhea and abdominal pain for the past year, more associated with eating out, birthday parties. etc.\par On monday, had diffuse abdominal pain, nausea, no vomiting, no fevers, had diarrhea, went o UC, had sono on 10/7 gall stones, numerous, normal CBD.  no blood tests done.  Now eating a lighter diet, did not take any meds.  \par \par Requests a second US (aware that it will not be covered).\par \par Seeing a general surgeon on Friday for evaluation (Dr. Bernal)

## 2019-10-21 NOTE — ED ADULT NURSE NOTE - NS ED NURSE LEVEL OF CONSCIOUSNESS MENTAL STATUS
1  )  Low 2 g sodium diet    2 )  Monitor weights at home    3 )  Avoid NSAIDs    4 )  Monitor blood pressure at home, call if blood pressure greater than 150/90 persistently    5 ) Check Blood work in 1 week
Awake/Alert

## 2019-10-30 ENCOUNTER — APPOINTMENT (OUTPATIENT)
Dept: NUTRITION | Facility: CLINIC | Age: 39
End: 2019-10-30
Payer: COMMERCIAL

## 2019-10-30 PROCEDURE — 97802 MEDICAL NUTRITION INDIV IN: CPT

## 2019-11-05 ENCOUNTER — OTHER (OUTPATIENT)
Age: 39
End: 2019-11-05

## 2019-11-12 ENCOUNTER — OTHER (OUTPATIENT)
Age: 39
End: 2019-11-12

## 2019-11-13 ENCOUNTER — APPOINTMENT (OUTPATIENT)
Dept: OBGYN | Facility: CLINIC | Age: 39
End: 2019-11-13
Payer: COMMERCIAL

## 2019-11-13 ENCOUNTER — ASOB RESULT (OUTPATIENT)
Age: 39
End: 2019-11-13

## 2019-11-13 VITALS
HEART RATE: 90 BPM | DIASTOLIC BLOOD PRESSURE: 66 MMHG | SYSTOLIC BLOOD PRESSURE: 116 MMHG | WEIGHT: 163 LBS | BODY MASS INDEX: 27.83 KG/M2 | HEIGHT: 64 IN

## 2019-11-13 PROCEDURE — 99395 PREV VISIT EST AGE 18-39: CPT

## 2019-11-13 PROCEDURE — 76830 TRANSVAGINAL US NON-OB: CPT

## 2019-11-20 ENCOUNTER — APPOINTMENT (OUTPATIENT)
Dept: INTERNAL MEDICINE | Facility: CLINIC | Age: 39
End: 2019-11-20
Payer: COMMERCIAL

## 2019-11-20 VITALS
OXYGEN SATURATION: 96 % | HEIGHT: 64 IN | HEART RATE: 81 BPM | DIASTOLIC BLOOD PRESSURE: 64 MMHG | TEMPERATURE: 98.1 F | SYSTOLIC BLOOD PRESSURE: 110 MMHG | BODY MASS INDEX: 28 KG/M2 | WEIGHT: 164 LBS

## 2019-11-20 DIAGNOSIS — Z87.19 PERSONAL HISTORY OF OTHER DISEASES OF THE DIGESTIVE SYSTEM: ICD-10-CM

## 2019-11-20 PROCEDURE — 99213 OFFICE O/P EST LOW 20 MIN: CPT

## 2019-11-21 PROBLEM — Z87.19 HISTORY OF CHOLELITHIASIS: Status: RESOLVED | Noted: 2019-10-09 | Resolved: 2019-11-21

## 2019-11-21 NOTE — ASSESSMENT
[FreeTextEntry1] : 40 y/o female, about 6 weeks s/p lap néstor, with intermittent sensation of inadequate deep breathing, as well as intermittent lower abd discomfort without SOB, BRUCE, fevers, nausea, vomiting, sig bowel changes, and a normal exam.  initially May be related to post operative ileus/atelectasis, as symptoms were initially more constant.  now intermittent symptoms.  Advised patient to track symptoms, specifically triggers, reslated symptoms, and to call return if symptoms persist or worsen.

## 2019-11-21 NOTE — HISTORY OF PRESENT ILLNESS
[de-identified] : S/p Lap Karissa in October, saw surgeon 1 week after surgery.  \par \par Occ has the sensation of not being able to get a deep breath. happened for three weeks after surgery, then went back to normal, has recurred again, comes and goes, some relief with a deep inspiration.  rarely has pain in chest.  rarely has BRUCE.  no fevers.  \par \par Lower abd pain, Bms every two days.  Pain is more obvious after she has slept on her side or stomach. this happens intermittently, not currently.  has intermittent nausea, but it is not clear that this occurs with the lower abd sensation.  no urinary complatins.  no fever,  not related to dietary intake.

## 2019-11-21 NOTE — PHYSICAL EXAM
[No Acute Distress] : no acute distress [Well-Appearing] : well-appearing [Normal Sclera/Conjunctiva] : normal sclera/conjunctiva [No Lymphadenopathy] : no lymphadenopathy [Supple] : supple [No Respiratory Distress] : no respiratory distress  [Clear to Auscultation] : lungs were clear to auscultation bilaterally [Normal Rate] : normal rate  [Regular Rhythm] : with a regular rhythm [Normal S1, S2] : normal S1 and S2 [No Murmur] : no murmur heard [Soft] : abdomen soft [Non Tender] : non-tender [Non-distended] : non-distended [No CVA Tenderness] : no CVA  tenderness

## 2019-11-26 ENCOUNTER — OTHER (OUTPATIENT)
Age: 39
End: 2019-11-26

## 2019-12-01 LAB
CYTOLOGY CVX/VAG DOC THIN PREP: NORMAL
HPV HIGH+LOW RISK DNA PNL CVX: NOT DETECTED

## 2019-12-01 NOTE — HISTORY OF PRESENT ILLNESS
[Good] : being in good health [Last Mammogram ___] : Last Mammogram was [unfilled] [Last Pap ___] : Last cervical pap smear was [unfilled] [Sexually Active] : is sexually active [Monogamous] : is monogamous [Contraception] : uses contraception [Condoms] : uses condoms

## 2019-12-03 ENCOUNTER — OTHER (OUTPATIENT)
Age: 39
End: 2019-12-03

## 2019-12-09 ENCOUNTER — OTHER (OUTPATIENT)
Age: 39
End: 2019-12-09

## 2019-12-10 ENCOUNTER — APPOINTMENT (OUTPATIENT)
Dept: GASTROENTEROLOGY | Facility: CLINIC | Age: 39
End: 2019-12-10
Payer: COMMERCIAL

## 2019-12-10 VITALS
HEART RATE: 84 BPM | HEIGHT: 64 IN | OXYGEN SATURATION: 98 % | BODY MASS INDEX: 27.31 KG/M2 | DIASTOLIC BLOOD PRESSURE: 60 MMHG | TEMPERATURE: 98.7 F | RESPIRATION RATE: 15 BRPM | SYSTOLIC BLOOD PRESSURE: 110 MMHG | WEIGHT: 160 LBS

## 2019-12-10 DIAGNOSIS — M22.2X1 PATELLOFEMORAL DISORDERS, RIGHT KNEE: ICD-10-CM

## 2019-12-10 DIAGNOSIS — R92.8 OTHER ABNORMAL AND INCONCLUSIVE FINDINGS ON DIAGNOSTIC IMAGING OF BREAST: ICD-10-CM

## 2019-12-10 DIAGNOSIS — Z86.19 PERSONAL HISTORY OF OTHER INFECTIOUS AND PARASITIC DISEASES: ICD-10-CM

## 2019-12-10 PROCEDURE — 99205 OFFICE O/P NEW HI 60 MIN: CPT

## 2019-12-11 ENCOUNTER — APPOINTMENT (OUTPATIENT)
Dept: OBGYN | Facility: CLINIC | Age: 39
End: 2019-12-11

## 2019-12-24 ENCOUNTER — OTHER (OUTPATIENT)
Age: 39
End: 2019-12-24

## 2019-12-27 NOTE — CONSULT LETTER
[Dear  ___] : Dear  [unfilled], [Please see my note below.] : Please see my note below. [Consult Letter:] : I had the pleasure of evaluating your patient, [unfilled]. [FreeTextEntry3] : MD ROSY [facility]\par Associate Professor of Medicine\par [employer] [facility]  [FreeTextEntry2] : Meghan Bill MD [Sincerely,] : Sincerely,

## 2019-12-27 NOTE — ASSESSMENT
[FreeTextEntry1] : Assessment\par 1) chronic acid reflux symptoms, EGD offered to the patient; however, she is reluctant to have the procedure; the symptoms have not increased over the years; recent CBC and CMP within normal\par 2) chronic constipation with dyschezia, patient should take more psyllium husk and osmotic laxatives\par 3) atypical chest pain and pressure relieved by eructation, due to acid reflux\par 4) overweight with BMI 27, the patient is working on this\par 5) average risk for colon cancer\par Plan\par 1) famotidine 40 mg bid prn\par 2)  Dietary strategies discussed with the patient including use of psyllium husk before breakfast and supper and Glucerna in place of lunch; mild exercise also discussed; weight once weekly; think of calory spending \par 3) use milk of magnesia as an antacid and laxative at night to have a bowel movement in the morning\par 4) office follow-up in 3 months to monitor results of therapies

## 2019-12-27 NOTE — PHYSICAL EXAM
[General Appearance - Alert] : alert [General Appearance - In No Acute Distress] : in no acute distress [Sclera] : the sclera and conjunctiva were normal [PERRL With Normal Accommodation] : pupils were equal in size, round, and reactive to light [Examination Of The Oral Cavity] : the lips and gums were normal [Oropharynx] : the oropharynx was normal [Neck Appearance] : the appearance of the neck was normal [Jugular Venous Distention Increased] : there was no jugular-venous distention [Neck Cervical Mass (___cm)] : no neck mass was observed [Thyroid Nodule] : there were no palpable thyroid nodules [Thyroid Diffuse Enlargement] : the thyroid was not enlarged [] : no respiratory distress [Auscultation Breath Sounds / Voice Sounds] : lungs were clear to auscultation bilaterally [Heart Rate And Rhythm] : heart rate was normal and rhythm regular [Heart Sounds] : normal S1 and S2 [Heart Sounds Gallop] : no gallops [Heart Sounds Pericardial Friction Rub] : no pericardial rub [Murmurs] : no murmurs [Arterial Pulses Carotid] : carotid pulses were normal with no bruits [Abdominal Aorta] : the abdominal aorta was normal [Full Pulse] : the pedal pulses are present [Edema] : there was no peripheral edema [Abdomen Mass (___ Cm)] : no abdominal mass palpated [Abdomen Soft] : soft [Bowel Sounds] : normal bowel sounds [Abdomen Hernia] : no hernia was discovered [Epigastric] : in the epigastric area [No Rectal Mass] : no rectal mass [Normal Sphincter Tone] : normal sphincter tone [Cervical Lymph Nodes Enlarged Anterior Bilaterally] : anterior cervical [Supraclavicular Lymph Nodes Enlarged Bilaterally] : supraclavicular [Cervical Lymph Nodes Enlarged Posterior Bilaterally] : posterior cervical [Axillary Lymph Nodes Enlarged Bilaterally] : axillary [Femoral Lymph Nodes Enlarged Bilaterally] : femoral [No CVA Tenderness] : no ~M costovertebral angle tenderness [Inguinal Lymph Nodes Enlarged Bilaterally] : inguinal [No Spinal Tenderness] : no spinal tenderness [Abnormal Walk] : normal gait [Nail Clubbing] : no clubbing  or cyanosis of the fingernails [Musculoskeletal - Swelling] : no joint swelling seen [Motor Tone] : muscle strength and tone were normal [Sensation] : the sensory exam was normal to light touch and pinprick [Deep Tendon Reflexes (DTR)] : deep tendon reflexes were 2+ and symmetric [No Focal Deficits] : no focal deficits [Impaired Insight] : insight and judgment were intact [Oriented To Time, Place, And Person] : oriented to person, place, and time [Affect] : the affect was normal [FreeTextEntry1] : Mallampati 1 [Occult Blood Positive] : stool was negative for occult blood

## 2019-12-27 NOTE — HISTORY OF PRESENT ILLNESS
[Heartburn] : stable heartburn [de-identified] : Ms. YONNY SENIOR, a 39 year old lady, is seen for evaluation of dyspepsia manifest by heartburn, epigastric pain and bloating.  The pain is related to meals.  She has noted substernal pressure.  If she belches, she feels better.  She has lost about 10 - 14 lbs since 2017 by dieting.  The patient denies nocturnal pain, nocturnal cough, nausea, vomiting, a change in bowel habit, dysphagia, jaundice, melena or hematochezia.  She has  bowel movements every 2-3 days in spite of taking psyllium husk.  \par \par Her mother has GERD.  The maternal aunts had cholecystectomy.  There is no other family history of colon cancer, colon polyp, peptic ulcer disease, female genitourinary disease, liver disease, cholelithiasis, pancreatic disease or cancer, inflammatory bowel disease or celiac disease.

## 2020-01-07 ENCOUNTER — NON-APPOINTMENT (OUTPATIENT)
Age: 40
End: 2020-01-07

## 2020-01-07 ENCOUNTER — OTHER (OUTPATIENT)
Age: 40
End: 2020-01-07

## 2020-01-07 ENCOUNTER — APPOINTMENT (OUTPATIENT)
Dept: CARDIOLOGY | Facility: CLINIC | Age: 40
End: 2020-01-07
Payer: COMMERCIAL

## 2020-01-07 VITALS
DIASTOLIC BLOOD PRESSURE: 72 MMHG | WEIGHT: 160 LBS | HEART RATE: 82 BPM | BODY MASS INDEX: 27.31 KG/M2 | RESPIRATION RATE: 16 BRPM | OXYGEN SATURATION: 100 % | SYSTOLIC BLOOD PRESSURE: 113 MMHG | HEIGHT: 64 IN

## 2020-01-07 PROCEDURE — 93000 ELECTROCARDIOGRAM COMPLETE: CPT

## 2020-01-07 PROCEDURE — 99204 OFFICE O/P NEW MOD 45 MIN: CPT

## 2020-01-07 NOTE — DISCUSSION/SUMMARY
[FreeTextEntry1] : 39 year old woman with atypical chest pain here for evaluation\par Will check stress echo to rule out ischemia\par FU thereafter

## 2020-01-07 NOTE — PHYSICAL EXAM
[Normal Appearance] : normal appearance [General Appearance - Well Developed] : well developed [General Appearance - Well Nourished] : well nourished [Well Groomed] : well groomed [No Deformities] : no deformities [General Appearance - In No Acute Distress] : no acute distress [Eyelids - No Xanthelasma] : the eyelids demonstrated no xanthelasmas [Normal Conjunctiva] : the conjunctiva exhibited no abnormalities [Normal Oral Mucosa] : normal oral mucosa [No Oral Pallor] : no oral pallor [No Oral Cyanosis] : no oral cyanosis [Normal Jugular Venous A Waves Present] : normal jugular venous A waves present [Normal Jugular Venous V Waves Present] : normal jugular venous V waves present [Heart Rate And Rhythm] : heart rate and rhythm were normal [No Jugular Venous Hargrove A Waves] : no jugular venous hargrove A waves [Murmurs] : no murmurs present [Heart Sounds] : normal S1 and S2 [Respiration, Rhythm And Depth] : normal respiratory rhythm and effort [Exaggerated Use Of Accessory Muscles For Inspiration] : no accessory muscle use [Auscultation Breath Sounds / Voice Sounds] : lungs were clear to auscultation bilaterally [Abdomen Tenderness] : non-tender [Abdomen Soft] : soft [Abnormal Walk] : normal gait [Abdomen Mass (___ Cm)] : no abdominal mass palpated [Nail Clubbing] : no clubbing of the fingernails [Gait - Sufficient For Exercise Testing] : the gait was sufficient for exercise testing [Cyanosis, Localized] : no localized cyanosis [Petechial Hemorrhages (___cm)] : no petechial hemorrhages [] : no ischemic changes

## 2020-01-07 NOTE — HISTORY OF PRESENT ILLNESS
[FreeTextEntry1] : 39 year old woman with history of chest pain. She has an uncle that  from MI and Dad with CAD. She states that she feels numbness in bilateral arms; chest pain is epigastric in nature; can come and go and stay for 20 minutes at time. Has been occurring since October.

## 2020-01-15 ENCOUNTER — APPOINTMENT (OUTPATIENT)
Dept: OBGYN | Facility: CLINIC | Age: 40
End: 2020-01-15

## 2020-01-27 ENCOUNTER — OTHER (OUTPATIENT)
Age: 40
End: 2020-01-27

## 2020-01-28 ENCOUNTER — OUTPATIENT (OUTPATIENT)
Dept: OUTPATIENT SERVICES | Facility: HOSPITAL | Age: 40
LOS: 1 days | End: 2020-01-28

## 2020-01-28 ENCOUNTER — APPOINTMENT (OUTPATIENT)
Dept: CV DIAGNOSITCS | Facility: HOSPITAL | Age: 40
End: 2020-01-28
Payer: COMMERCIAL

## 2020-01-28 DIAGNOSIS — Z98.89 OTHER SPECIFIED POSTPROCEDURAL STATES: Chronic | ICD-10-CM

## 2020-01-28 LAB — HCG UR QL: NEGATIVE — SIGNIFICANT CHANGE UP

## 2020-01-28 PROCEDURE — 93320 DOPPLER ECHO COMPLETE: CPT | Mod: 26,GC

## 2020-01-28 PROCEDURE — 93325 DOPPLER ECHO COLOR FLOW MAPG: CPT | Mod: 26,GC

## 2020-01-28 PROCEDURE — 93350 STRESS TTE ONLY: CPT | Mod: 26

## 2020-01-29 ENCOUNTER — APPOINTMENT (OUTPATIENT)
Dept: INTERNAL MEDICINE | Facility: CLINIC | Age: 40
End: 2020-01-29
Payer: COMMERCIAL

## 2020-01-29 VITALS
BODY MASS INDEX: 26.63 KG/M2 | DIASTOLIC BLOOD PRESSURE: 60 MMHG | OXYGEN SATURATION: 98 % | SYSTOLIC BLOOD PRESSURE: 130 MMHG | HEIGHT: 64 IN | WEIGHT: 156 LBS | TEMPERATURE: 97.8 F | HEART RATE: 78 BPM

## 2020-01-29 DIAGNOSIS — R06.09 OTHER FORMS OF DYSPNEA: ICD-10-CM

## 2020-01-29 DIAGNOSIS — Z00.00 ENCOUNTER FOR GENERAL ADULT MEDICAL EXAMINATION W/OUT ABNORMAL FINDINGS: ICD-10-CM

## 2020-01-29 PROCEDURE — 99395 PREV VISIT EST AGE 18-39: CPT

## 2020-01-29 NOTE — ASSESSMENT
[FreeTextEntry1] : 40 y/o female for CPE\par \par HCM: declines flu shot\par up to date with PAP, labs, has had mammo as well\par \par Continues to see Dr Benavides with good effect\par GERD stable.

## 2020-01-29 NOTE — HISTORY OF PRESENT ILLNESS
[FreeTextEntry1] : CPE [de-identified] : Multiple recent specialist visits\par \par Cards/smith:  Stress echo yesterday, results not available yet, but patient reports is normal.  \par GI: Cerulli. GERD\par Seeing Dr. Benavides as well from [facility]\par GYN: [facility], has left ovarian cyst 3.3 cm 11/19 sono, normal PAP and neg HPV\par Mammo 9/2019 Birads 2\par \par Will be moving into a new home without in laws in June 2020.  \par Labs October 2019 NOrmal\par \par Overall feeling well, feels anxiety is less (though upcoming move is a source of stress).  only occ right sided chest pains while driving, better with position changes.  no SOB reported.  Still alternating between constipation and diarrhea, but this is stable.  now only using pepcid prn, dyspepsia only occasionally.

## 2020-01-29 NOTE — PHYSICAL EXAM
[Well-Appearing] : well-appearing [No Acute Distress] : no acute distress [Normal Oropharynx] : the oropharynx was normal [Normal Sclera/Conjunctiva] : normal sclera/conjunctiva [No Lymphadenopathy] : no lymphadenopathy [No Respiratory Distress] : no respiratory distress  [Clear to Auscultation] : lungs were clear to auscultation bilaterally [Normal Rate] : normal rate  [Normal S1, S2] : normal S1 and S2 [Regular Rhythm] : with a regular rhythm [No Murmur] : no murmur heard [No Edema] : there was no peripheral edema [Soft] : abdomen soft [Non Tender] : non-tender [No Rash] : no rash

## 2020-02-08 ENCOUNTER — TRANSCRIPTION ENCOUNTER (OUTPATIENT)
Age: 40
End: 2020-02-08

## 2020-02-10 ENCOUNTER — APPOINTMENT (OUTPATIENT)
Dept: GASTROENTEROLOGY | Facility: CLINIC | Age: 40
End: 2020-02-10
Payer: COMMERCIAL

## 2020-02-10 VITALS
BODY MASS INDEX: 26.29 KG/M2 | TEMPERATURE: 97.7 F | SYSTOLIC BLOOD PRESSURE: 110 MMHG | RESPIRATION RATE: 15 BRPM | WEIGHT: 154 LBS | HEART RATE: 56 BPM | OXYGEN SATURATION: 99 % | DIASTOLIC BLOOD PRESSURE: 68 MMHG | HEIGHT: 64 IN

## 2020-02-10 DIAGNOSIS — Z87.09 PERSONAL HISTORY OF OTHER DISEASES OF THE RESPIRATORY SYSTEM: ICD-10-CM

## 2020-02-10 DIAGNOSIS — Z87.898 PERSONAL HISTORY OF OTHER SPECIFIED CONDITIONS: ICD-10-CM

## 2020-02-10 DIAGNOSIS — L29.2 PRURITUS VULVAE: ICD-10-CM

## 2020-02-10 DIAGNOSIS — Z87.2 PERSONAL HISTORY OF DISEASES OF THE SKIN AND SUBCUTANEOUS TISSUE: ICD-10-CM

## 2020-02-10 DIAGNOSIS — Z09 ENCOUNTER FOR FOLLOW-UP EXAMINATION AFTER COMPLETED TREATMENT FOR CONDITIONS OTHER THAN MALIGNANT NEOPLASM: ICD-10-CM

## 2020-02-10 DIAGNOSIS — M25.569 PAIN IN UNSPECIFIED KNEE: ICD-10-CM

## 2020-02-10 DIAGNOSIS — N90.89 OTHER SPECIFIED NONINFLAMMATORY DISORDERS OF VULVA AND PERINEUM: ICD-10-CM

## 2020-02-10 DIAGNOSIS — N64.4 MASTODYNIA: ICD-10-CM

## 2020-02-10 PROCEDURE — 99214 OFFICE O/P EST MOD 30 MIN: CPT

## 2020-02-21 PROBLEM — L29.2 VULVAR ITCHING: Status: RESOLVED | Noted: 2018-11-26 | Resolved: 2020-02-21

## 2020-02-21 PROBLEM — N90.89 VULVAR IRRITATION: Status: RESOLVED | Noted: 2018-09-12 | Resolved: 2020-02-21

## 2020-02-21 PROBLEM — Z87.898 HISTORY OF SHORTNESS OF BREATH: Status: RESOLVED | Noted: 2018-02-01 | Resolved: 2020-02-21

## 2020-02-21 PROBLEM — Z87.898 HISTORY OF CHEST PAIN: Status: RESOLVED | Noted: 2018-02-01 | Resolved: 2020-02-21

## 2020-02-21 PROBLEM — N64.4 MASTALGIA IN FEMALE: Status: RESOLVED | Noted: 2019-04-30 | Resolved: 2020-02-21

## 2020-02-21 PROBLEM — L29.2 VULVAR ITCHING: Status: RESOLVED | Noted: 2018-06-01 | Resolved: 2020-02-21

## 2020-02-24 NOTE — PHYSICAL EXAM
[General Appearance - In No Acute Distress] : in no acute distress [General Appearance - Alert] : alert [Examination Of The Oral Cavity] : the lips and gums were normal [PERRL With Normal Accommodation] : pupils were equal in size, round, and reactive to light [Sclera] : the sclera and conjunctiva were normal [Oropharynx] : the oropharynx was normal [Neck Appearance] : the appearance of the neck was normal [Jugular Venous Distention Increased] : there was no jugular-venous distention [Thyroid Diffuse Enlargement] : the thyroid was not enlarged [Neck Cervical Mass (___cm)] : no neck mass was observed [Thyroid Nodule] : there were no palpable thyroid nodules [] : no respiratory distress [Auscultation Breath Sounds / Voice Sounds] : lungs were clear to auscultation bilaterally [Heart Sounds] : normal S1 and S2 [Heart Rate And Rhythm] : heart rate was normal and rhythm regular [Heart Sounds Pericardial Friction Rub] : no pericardial rub [Murmurs] : no murmurs [Heart Sounds Gallop] : no gallops [Abdominal Aorta] : the abdominal aorta was normal [Arterial Pulses Carotid] : carotid pulses were normal with no bruits [Edema] : there was no peripheral edema [Bowel Sounds] : normal bowel sounds [Full Pulse] : the pedal pulses are present [Abdomen Mass (___ Cm)] : no abdominal mass palpated [Abdomen Soft] : soft [Abdomen Hernia] : no hernia was discovered [Epigastric] : in the epigastric area [Normal Sphincter Tone] : normal sphincter tone [No Rectal Mass] : no rectal mass [Cervical Lymph Nodes Enlarged Posterior Bilaterally] : posterior cervical [Supraclavicular Lymph Nodes Enlarged Bilaterally] : supraclavicular [Cervical Lymph Nodes Enlarged Anterior Bilaterally] : anterior cervical [Axillary Lymph Nodes Enlarged Bilaterally] : axillary [Femoral Lymph Nodes Enlarged Bilaterally] : femoral [Inguinal Lymph Nodes Enlarged Bilaterally] : inguinal [No Spinal Tenderness] : no spinal tenderness [No CVA Tenderness] : no ~M costovertebral angle tenderness [Abnormal Walk] : normal gait [Musculoskeletal - Swelling] : no joint swelling seen [Nail Clubbing] : no clubbing  or cyanosis of the fingernails [Deep Tendon Reflexes (DTR)] : deep tendon reflexes were 2+ and symmetric [Motor Tone] : muscle strength and tone were normal [No Focal Deficits] : no focal deficits [Sensation] : the sensory exam was normal to light touch and pinprick [Oriented To Time, Place, And Person] : oriented to person, place, and time [Impaired Insight] : insight and judgment were intact [Affect] : the affect was normal [FreeTextEntry1] : Mallampati 1 [Occult Blood Positive] : stool was negative for occult blood

## 2020-02-24 NOTE — HISTORY OF PRESENT ILLNESS
[Heartburn] : stable heartburn [de-identified] : Ms. YONNY SENIOR, a 39 year old lady, is seen for evaluation of dyspepsia manifest by heartburn, epigastric pain and bloating.  The dyspepsia has mostly resolved since her cholecystectomy; she take famotidine as needed..  She had a cholecystectomy for cholelithiasis  in October 2019 at [facility] where her 's uncle works.  She now has urgency with a loose stools with relief of symptoms after she has a loose bowel movement; this occurs after ingesting fatty foods.    She notes abdominal pain after eating fatty foods even after the cholecystectomy.  She has also lost  18 lbs. since October 2019 resulting in BMI of 26.    She had noted substernal pressure.   If she belches, she feels better.  In the past, she had lost weight by dieting..  The patient denies nocturnal pain, nocturnal cough, nausea, vomiting, a change in bowel habit, dysphagia, jaundice, melena or hematochezia.  She has  bowel movements every 2-3 days in spite of taking psyllium husk; however, she only takes it sporadically..  \par \par Her mother has GERD.  The maternal aunts had cholecystectomies.  There is no other family history of colon cancer, colon polyp, peptic ulcer disease, female genitourinary disease, liver disease, cholelithiasis, pancreatic disease or cancer, inflammatory bowel disease or celiac disease.

## 2020-02-24 NOTE — ASSESSMENT
[FreeTextEntry1] : Assessment\par 1) abdominal cramping and loose stools with fatty food intolerance after cholecystectomy in October 2019 may be due to  lack of gallbladder bile being available for  digestion, this should improve over time\par 2) chronic constipation with dyschezia, patient should take more psyllium husk and osmotic laxatives\par 3) atypical chest pain and pressure relieved by eructation, due to acid reflux\par 4) overweight with BMI 26.4, the patient is working on this\par 5) chronic acid reflux symptoms now much improved, EGD offered to the patient; however, her symptoms have mostly resolved while taking famotidine intermittently and after cholecystectomy\par Plan\par 1) famotidine 40 mg bid prn\par 2)  Dietary strategies discussed with the patient including use of psyllium husk before breakfast and supper and Glucerna in place of lunch; mild exercise also discussed; weight once weekly; think of calory spending \par 3) use milk of magnesia as an antacid and laxative at night to have a bowel movement in the morning\par 4) office follow-up in 3 months to monitor results of therapies

## 2020-02-24 NOTE — REASON FOR VISIT
[Consultation] : a consultation visit [Follow-Up: _____] : a [unfilled] follow-up visit [FreeTextEntry1] : dyspepsia

## 2020-06-08 ENCOUNTER — APPOINTMENT (OUTPATIENT)
Dept: GASTROENTEROLOGY | Facility: CLINIC | Age: 40
End: 2020-06-08
Payer: COMMERCIAL

## 2020-06-08 VITALS — BODY MASS INDEX: 25.23 KG/M2 | WEIGHT: 147 LBS

## 2020-06-08 DIAGNOSIS — Z87.898 PERSONAL HISTORY OF OTHER SPECIFIED CONDITIONS: ICD-10-CM

## 2020-06-08 DIAGNOSIS — R07.89 OTHER CHEST PAIN: ICD-10-CM

## 2020-06-08 DIAGNOSIS — Z80.0 FAMILY HISTORY OF MALIGNANT NEOPLASM OF DIGESTIVE ORGANS: ICD-10-CM

## 2020-06-08 PROCEDURE — 99214 OFFICE O/P EST MOD 30 MIN: CPT

## 2020-06-23 NOTE — ASSESSMENT
[FreeTextEntry1] : Assessment\par 1) abdominal cramping and loose stools with fatty food intolerance after cholecystectomy in October 2019 may be due to lack of gallbladder bile being available for digestion, this should improve over time\par 2) chronic constipation with dyschezia, patient should take more psyllium husk and more consistently (emphasized) and osmotic laxatives such as Miralax to have a bowel movement every other day\par 3) atypical chest pain and pressure relieved by eructation, due to acid reflux\par 4) overweight with BMI 26.4, the patient is working on this\par 5) chronic acid reflux symptoms now much improved, EGD offered to the patient; however, her symptoms have mostly resolved while taking famotidine intermittently and after cholecystectomy\par Plan\par 1) famotidine 40 mg bid prn\par 2) Dietary strategies discussed again with the patient including use of psyllium husk before breakfast and supper and Glucerna in place of lunch; mild exercise also discussed; weight once weekly; think of calory spending \par 3) use milk of magnesia as an antacid and laxative at night to have a bowel movement in the morning; can take as much Miralax as needed to have a bowel movement since it is the main laxative used for colonoscopy preparation and is not absorbed\par 4) follow-up in 3 months to monitor results of therapies. \par

## 2020-06-23 NOTE — HISTORY OF PRESENT ILLNESS
[Other Location: e.g. Home (Enter Location, City,State)___] : at [unfilled] [Home] : at home, [unfilled] , at the time of the visit. [Verbal consent obtained from patient] : the patient, [unfilled] [de-identified] : This visit was performed via Telehealth realtime 2-way audiovisual technology and lasted 30 minutes. \par \par Ms. YONNY SENIOR, a 40 year old lady, is seen for follow-up evaluation of dyspepsia manifest by heartburn, epigastric and chest pain and bloating.  The dyspepsia has mostly resolved since her cholecystectomy; she takes famotidine as needed, usually about once per month.   Her cardiac work-up was negative for heart disease.  She had a cholecystectomy for cholelithiasis in 2019 at [facility] where her 's uncle works. She was having urgency with a loose stools with relief of symptoms after she has a loose bowel movement; this had occurred after ingesting fatty foods, but has pretty much resolved..  She still notes abdominal pain after eating fatty foods even after the cholecystectomy. She has also lost 18 lbs. since 2019 resulting in BMI of 26. She had noted substernal pressure. If she belches, she feels better. In the past, she had lost weight by dieting.. The patient denies nocturnal pain, nocturnal cough, nausea, vomiting, a change in bowel habit, dysphagia, jaundice, melena or hematochezia. She has bowel movements remain as the major problem with movements every 2-3 days in spite of taking psyllium husk; however, she only takes it sporadically and has noted more problems if she forgets to drink water.. \par \par Her mother and father have GERD. The maternal aunts had cholecystectomies.  A maternal uncle  of esophageal cancer.   There is no other family history of colon cancer, colon polyp, peptic ulcer disease, female genitourinary disease, liver disease, cholelithiasis, pancreatic disease or cancer, inflammatory bowel disease or celiac disease.

## 2020-07-02 ENCOUNTER — APPOINTMENT (OUTPATIENT)
Dept: OBGYN | Facility: CLINIC | Age: 40
End: 2020-07-02
Payer: COMMERCIAL

## 2020-07-02 ENCOUNTER — ASOB RESULT (OUTPATIENT)
Age: 40
End: 2020-07-02

## 2020-07-02 VITALS
HEIGHT: 64 IN | TEMPERATURE: 98.1 F | BODY MASS INDEX: 25.27 KG/M2 | SYSTOLIC BLOOD PRESSURE: 108 MMHG | DIASTOLIC BLOOD PRESSURE: 68 MMHG | HEART RATE: 89 BPM | WEIGHT: 148 LBS

## 2020-07-02 DIAGNOSIS — N39.41 URGE INCONTINENCE: ICD-10-CM

## 2020-07-02 DIAGNOSIS — Z01.419 ENCOUNTER FOR GYNECOLOGICAL EXAMINATION (GENERAL) (ROUTINE) W/OUT ABNORMAL FINDINGS: ICD-10-CM

## 2020-07-02 PROCEDURE — 76830 TRANSVAGINAL US NON-OB: CPT

## 2020-07-02 PROCEDURE — 99395 PREV VISIT EST AGE 18-39: CPT

## 2020-10-15 ENCOUNTER — NON-APPOINTMENT (OUTPATIENT)
Age: 40
End: 2020-10-15

## 2020-10-27 ENCOUNTER — NON-APPOINTMENT (OUTPATIENT)
Age: 40
End: 2020-10-27

## 2020-11-10 ENCOUNTER — NON-APPOINTMENT (OUTPATIENT)
Age: 40
End: 2020-11-10

## 2020-11-24 ENCOUNTER — NON-APPOINTMENT (OUTPATIENT)
Age: 40
End: 2020-11-24

## 2020-12-01 ENCOUNTER — NON-APPOINTMENT (OUTPATIENT)
Age: 40
End: 2020-12-01

## 2020-12-08 ENCOUNTER — NON-APPOINTMENT (OUTPATIENT)
Age: 40
End: 2020-12-08

## 2020-12-14 ENCOUNTER — APPOINTMENT (OUTPATIENT)
Dept: INTERNAL MEDICINE | Facility: CLINIC | Age: 40
End: 2020-12-14
Payer: COMMERCIAL

## 2020-12-14 VITALS
BODY MASS INDEX: 24.75 KG/M2 | OXYGEN SATURATION: 98 % | WEIGHT: 145 LBS | HEART RATE: 92 BPM | HEIGHT: 64 IN | TEMPERATURE: 98.1 F | SYSTOLIC BLOOD PRESSURE: 112 MMHG | DIASTOLIC BLOOD PRESSURE: 60 MMHG

## 2020-12-14 DIAGNOSIS — G89.29 PAIN IN RIGHT SHOULDER: ICD-10-CM

## 2020-12-14 DIAGNOSIS — K59.09 OTHER CONSTIPATION: ICD-10-CM

## 2020-12-14 DIAGNOSIS — K21.9 GASTRO-ESOPHAGEAL REFLUX DISEASE W/OUT ESOPHAGITIS: ICD-10-CM

## 2020-12-14 DIAGNOSIS — M25.511 PAIN IN RIGHT SHOULDER: ICD-10-CM

## 2020-12-14 PROCEDURE — 99072 ADDL SUPL MATRL&STAF TM PHE: CPT

## 2020-12-14 PROCEDURE — 99214 OFFICE O/P EST MOD 30 MIN: CPT | Mod: 25

## 2020-12-14 PROCEDURE — 36415 COLL VENOUS BLD VENIPUNCTURE: CPT

## 2020-12-14 RX ORDER — FAMOTIDINE 40 MG/1
40 TABLET, FILM COATED ORAL TWICE DAILY
Qty: 60 | Refills: 11 | Status: DISCONTINUED | COMMUNITY
Start: 2019-12-10 | End: 2020-12-14

## 2020-12-14 NOTE — ASSESSMENT
[FreeTextEntry1] : 39 y/o female for full f/u, doing well!\par \par Weight loss with healthy eating: check labs today\par \par Right shoulder pain:  PT referral\par \par HCM:\par Declines flu snot\par \par Mood: doing well overall

## 2020-12-14 NOTE — REVIEW OF SYSTEMS
[Recent Change In Weight] : ~T recent weight change [Abdominal Pain] : no abdominal pain [Constipation] : constipation [Vomiting] : no vomiting [Heartburn] : no heartburn [Back Pain] : back pain [Negative] : Genitourinary

## 2020-12-14 NOTE — HISTORY OF PRESENT ILLNESS
[de-identified] : Here for f/u (last CPE in jan 2020)\par \par Has some tinnitus in her left ear x 1 week, no hearing loss.  \par \par Lost 30 lbs with dietary changes!!!\par \par Mood is good, anxiety is improved, working with Dr. Benavides. \par \par Moved into a new home in [place] with  and children\par \par Has some right shoulder pain, at times starts in her right neck and moves to her upper right arm.  no weakness.  worse if she is more active. \par \par Right knee also pains her momentarily aftert sitting, resolves once she stands up and starts moving.\par \par still has constiption, managed with miralax\par \par No dyspepsia, only if she eats spicy food, and improves if she drinks a lot of water.  \par \par Declines flu shot\par \par

## 2020-12-14 NOTE — PHYSICAL EXAM
[No Acute Distress] : no acute distress [Well-Appearing] : well-appearing [Normal Sclera/Conjunctiva] : normal sclera/conjunctiva [No Lymphadenopathy] : no lymphadenopathy [No Respiratory Distress] : no respiratory distress  [Clear to Auscultation] : lungs were clear to auscultation bilaterally [Normal Rate] : normal rate  [Regular Rhythm] : with a regular rhythm [Normal S1, S2] : normal S1 and S2 [No Murmur] : no murmur heard [No Edema] : there was no peripheral edema [Soft] : abdomen soft [Non Tender] : non-tender [Non-distended] : non-distended [No Joint Swelling] : no joint swelling [Normal Affect] : the affect was normal [Normal Mood] : the mood was normal

## 2020-12-15 ENCOUNTER — NON-APPOINTMENT (OUTPATIENT)
Age: 40
End: 2020-12-15

## 2020-12-15 LAB
ALBUMIN SERPL ELPH-MCNC: 4.5 G/DL
ALP BLD-CCNC: 60 U/L
ALT SERPL-CCNC: 9 U/L
ANION GAP SERPL CALC-SCNC: 9 MMOL/L
AST SERPL-CCNC: 16 U/L
BASOPHILS # BLD AUTO: 0.05 K/UL
BASOPHILS NFR BLD AUTO: 0.6 %
BILIRUB SERPL-MCNC: 0.4 MG/DL
BUN SERPL-MCNC: 9 MG/DL
CALCIUM SERPL-MCNC: 10 MG/DL
CHLORIDE SERPL-SCNC: 102 MMOL/L
CHOLEST SERPL-MCNC: 148 MG/DL
CO2 SERPL-SCNC: 29 MMOL/L
CREAT SERPL-MCNC: 0.79 MG/DL
EOSINOPHIL # BLD AUTO: 0.05 K/UL
EOSINOPHIL NFR BLD AUTO: 0.6 %
ESTIMATED AVERAGE GLUCOSE: 108 MG/DL
GLUCOSE SERPL-MCNC: 141 MG/DL
HBA1C MFR BLD HPLC: 5.4 %
HCT VFR BLD CALC: 34.7 %
HDLC SERPL-MCNC: 53 MG/DL
HGB BLD-MCNC: 11.9 G/DL
IMM GRANULOCYTES NFR BLD AUTO: 0.2 %
LDLC SERPL CALC-MCNC: 83 MG/DL
LYMPHOCYTES # BLD AUTO: 2 K/UL
LYMPHOCYTES NFR BLD AUTO: 24.7 %
MAN DIFF?: NORMAL
MCHC RBC-ENTMCNC: 32.8 PG
MCHC RBC-ENTMCNC: 34.3 GM/DL
MCV RBC AUTO: 95.6 FL
MONOCYTES # BLD AUTO: 0.63 K/UL
MONOCYTES NFR BLD AUTO: 7.8 %
NEUTROPHILS # BLD AUTO: 5.36 K/UL
NEUTROPHILS NFR BLD AUTO: 66.1 %
NONHDLC SERPL-MCNC: 95 MG/DL
PLATELET # BLD AUTO: 182 K/UL
POTASSIUM SERPL-SCNC: 4.1 MMOL/L
PROT SERPL-MCNC: 6.9 G/DL
RBC # BLD: 3.63 M/UL
RBC # FLD: 15.1 %
SODIUM SERPL-SCNC: 140 MMOL/L
TRIGL SERPL-MCNC: 58 MG/DL
TSH SERPL-ACNC: 1.65 UIU/ML
WBC # FLD AUTO: 8.11 K/UL

## 2020-12-22 ENCOUNTER — NON-APPOINTMENT (OUTPATIENT)
Age: 40
End: 2020-12-22

## 2020-12-29 ENCOUNTER — NON-APPOINTMENT (OUTPATIENT)
Age: 40
End: 2020-12-29

## 2020-12-30 ENCOUNTER — NON-APPOINTMENT (OUTPATIENT)
Age: 40
End: 2020-12-30

## 2021-01-12 ENCOUNTER — NON-APPOINTMENT (OUTPATIENT)
Age: 41
End: 2021-01-12

## 2021-01-19 ENCOUNTER — NON-APPOINTMENT (OUTPATIENT)
Age: 41
End: 2021-01-19

## 2021-02-02 ENCOUNTER — NON-APPOINTMENT (OUTPATIENT)
Age: 41
End: 2021-02-02

## 2021-02-09 ENCOUNTER — NON-APPOINTMENT (OUTPATIENT)
Age: 41
End: 2021-02-09

## 2021-02-16 ENCOUNTER — NON-APPOINTMENT (OUTPATIENT)
Age: 41
End: 2021-02-16

## 2021-02-23 ENCOUNTER — NON-APPOINTMENT (OUTPATIENT)
Age: 41
End: 2021-02-23

## 2021-03-02 ENCOUNTER — NON-APPOINTMENT (OUTPATIENT)
Age: 41
End: 2021-03-02

## 2021-03-08 ENCOUNTER — NON-APPOINTMENT (OUTPATIENT)
Age: 41
End: 2021-03-08

## 2021-03-16 ENCOUNTER — NON-APPOINTMENT (OUTPATIENT)
Age: 41
End: 2021-03-16

## 2021-03-23 ENCOUNTER — NON-APPOINTMENT (OUTPATIENT)
Age: 41
End: 2021-03-23

## 2021-03-30 ENCOUNTER — NON-APPOINTMENT (OUTPATIENT)
Age: 41
End: 2021-03-30

## 2021-04-06 ENCOUNTER — NON-APPOINTMENT (OUTPATIENT)
Age: 41
End: 2021-04-06

## 2021-04-13 ENCOUNTER — NON-APPOINTMENT (OUTPATIENT)
Age: 41
End: 2021-04-13

## 2021-04-20 ENCOUNTER — NON-APPOINTMENT (OUTPATIENT)
Age: 41
End: 2021-04-20

## 2021-04-27 ENCOUNTER — NON-APPOINTMENT (OUTPATIENT)
Age: 41
End: 2021-04-27

## 2021-05-04 ENCOUNTER — NON-APPOINTMENT (OUTPATIENT)
Age: 41
End: 2021-05-04

## 2021-05-11 ENCOUNTER — NON-APPOINTMENT (OUTPATIENT)
Age: 41
End: 2021-05-11

## 2021-05-12 ENCOUNTER — NON-APPOINTMENT (OUTPATIENT)
Age: 41
End: 2021-05-12

## 2021-06-01 ENCOUNTER — NON-APPOINTMENT (OUTPATIENT)
Age: 41
End: 2021-06-01

## 2021-06-15 ENCOUNTER — NON-APPOINTMENT (OUTPATIENT)
Age: 41
End: 2021-06-15

## 2021-06-22 ENCOUNTER — NON-APPOINTMENT (OUTPATIENT)
Age: 41
End: 2021-06-22

## 2021-06-29 ENCOUNTER — NON-APPOINTMENT (OUTPATIENT)
Age: 41
End: 2021-06-29

## 2021-07-06 ENCOUNTER — NON-APPOINTMENT (OUTPATIENT)
Age: 41
End: 2021-07-06

## 2021-07-13 ENCOUNTER — NON-APPOINTMENT (OUTPATIENT)
Age: 41
End: 2021-07-13

## 2021-07-22 ENCOUNTER — NON-APPOINTMENT (OUTPATIENT)
Age: 41
End: 2021-07-22

## 2021-07-27 ENCOUNTER — NON-APPOINTMENT (OUTPATIENT)
Age: 41
End: 2021-07-27

## 2021-08-03 ENCOUNTER — NON-APPOINTMENT (OUTPATIENT)
Age: 41
End: 2021-08-03

## 2021-08-12 ENCOUNTER — ASOB RESULT (OUTPATIENT)
Age: 41
End: 2021-08-12

## 2021-08-12 ENCOUNTER — APPOINTMENT (OUTPATIENT)
Dept: OBGYN | Facility: CLINIC | Age: 41
End: 2021-08-12
Payer: COMMERCIAL

## 2021-08-12 VITALS
HEART RATE: 69 BPM | BODY MASS INDEX: 26.05 KG/M2 | WEIGHT: 152.6 LBS | DIASTOLIC BLOOD PRESSURE: 64 MMHG | SYSTOLIC BLOOD PRESSURE: 103 MMHG | HEIGHT: 64 IN

## 2021-08-12 PROCEDURE — 76830 TRANSVAGINAL US NON-OB: CPT

## 2021-08-12 PROCEDURE — 99396 PREV VISIT EST AGE 40-64: CPT

## 2021-08-31 ENCOUNTER — NON-APPOINTMENT (OUTPATIENT)
Age: 41
End: 2021-08-31

## 2021-09-04 ENCOUNTER — TRANSCRIPTION ENCOUNTER (OUTPATIENT)
Age: 41
End: 2021-09-04

## 2021-09-04 LAB
CYTOLOGY CVX/VAG DOC THIN PREP: NORMAL
HPV HIGH+LOW RISK DNA PNL CVX: NOT DETECTED

## 2021-09-04 NOTE — HISTORY OF PRESENT ILLNESS
[FreeTextEntry1] : 40yo P2 LMP 8/6/21 presents for annual gyn exam.  Pt c/o RLQ pain radiating into groin and upper thigh for 3 days which occurred 2 wks ago.  No pain or c/o now.\par Sono today--3.2cm left ovarian complex mass; otherwise unremarkable

## 2021-09-14 ENCOUNTER — NON-APPOINTMENT (OUTPATIENT)
Age: 41
End: 2021-09-14

## 2021-09-27 ENCOUNTER — NON-APPOINTMENT (OUTPATIENT)
Age: 41
End: 2021-09-27

## 2021-10-12 ENCOUNTER — NON-APPOINTMENT (OUTPATIENT)
Age: 41
End: 2021-10-12

## 2021-10-19 ENCOUNTER — NON-APPOINTMENT (OUTPATIENT)
Age: 41
End: 2021-10-19

## 2021-10-26 ENCOUNTER — NON-APPOINTMENT (OUTPATIENT)
Age: 41
End: 2021-10-26

## 2021-11-09 ENCOUNTER — APPOINTMENT (OUTPATIENT)
Dept: OBGYN | Facility: CLINIC | Age: 41
End: 2021-11-09

## 2021-11-09 ENCOUNTER — NON-APPOINTMENT (OUTPATIENT)
Age: 41
End: 2021-11-09

## 2021-11-16 ENCOUNTER — NON-APPOINTMENT (OUTPATIENT)
Age: 41
End: 2021-11-16

## 2021-11-29 ENCOUNTER — NON-APPOINTMENT (OUTPATIENT)
Age: 41
End: 2021-11-29

## 2021-12-07 ENCOUNTER — NON-APPOINTMENT (OUTPATIENT)
Age: 41
End: 2021-12-07

## 2021-12-14 ENCOUNTER — NON-APPOINTMENT (OUTPATIENT)
Age: 41
End: 2021-12-14

## 2021-12-21 ENCOUNTER — NON-APPOINTMENT (OUTPATIENT)
Age: 41
End: 2021-12-21

## 2022-01-04 ENCOUNTER — NON-APPOINTMENT (OUTPATIENT)
Age: 42
End: 2022-01-04

## 2022-01-10 ENCOUNTER — NON-APPOINTMENT (OUTPATIENT)
Age: 42
End: 2022-01-10

## 2022-01-18 ENCOUNTER — NON-APPOINTMENT (OUTPATIENT)
Age: 42
End: 2022-01-18

## 2022-01-24 DIAGNOSIS — M79.605 LOW BACK PAIN, UNSPECIFIED: ICD-10-CM

## 2022-01-24 DIAGNOSIS — M54.50 LOW BACK PAIN, UNSPECIFIED: ICD-10-CM

## 2022-02-01 ENCOUNTER — APPOINTMENT (OUTPATIENT)
Dept: OBGYN | Facility: CLINIC | Age: 42
End: 2022-02-01

## 2022-02-01 ENCOUNTER — NON-APPOINTMENT (OUTPATIENT)
Age: 42
End: 2022-02-01

## 2022-02-08 ENCOUNTER — NON-APPOINTMENT (OUTPATIENT)
Age: 42
End: 2022-02-08

## 2022-02-15 ENCOUNTER — NON-APPOINTMENT (OUTPATIENT)
Age: 42
End: 2022-02-15

## 2022-02-17 ENCOUNTER — APPOINTMENT (OUTPATIENT)
Dept: OBGYN | Facility: CLINIC | Age: 42
End: 2022-02-17
Payer: COMMERCIAL

## 2022-02-17 ENCOUNTER — ASOB RESULT (OUTPATIENT)
Age: 42
End: 2022-02-17

## 2022-02-17 VITALS
BODY MASS INDEX: 27.66 KG/M2 | WEIGHT: 162 LBS | HEART RATE: 81 BPM | HEIGHT: 64 IN | SYSTOLIC BLOOD PRESSURE: 106 MMHG | DIASTOLIC BLOOD PRESSURE: 67 MMHG

## 2022-02-17 DIAGNOSIS — N89.8 OTHER SPECIFIED NONINFLAMMATORY DISORDERS OF VAGINA: ICD-10-CM

## 2022-02-17 DIAGNOSIS — R10.2 PELVIC AND PERINEAL PAIN: ICD-10-CM

## 2022-02-17 PROCEDURE — 76830 TRANSVAGINAL US NON-OB: CPT

## 2022-02-17 PROCEDURE — 99213 OFFICE O/P EST LOW 20 MIN: CPT

## 2022-03-01 ENCOUNTER — NON-APPOINTMENT (OUTPATIENT)
Age: 42
End: 2022-03-01

## 2022-03-01 NOTE — ED PROVIDER NOTE - GASTROINTESTINAL [-], MLM
Per request of Dr Huitron, phone call to patient home. Spoke with spouse as he reports patient sleeping. Informed per Dr Huitron that no chemo will be discussed today at time of visit. Oncotype result 11.  verbalizes understanding and agrees to inform her.   Mary Jane Lu RN  Care Coordinator  Cancer Clinic  
no abdominal pain/no melena

## 2022-03-15 ENCOUNTER — NON-APPOINTMENT (OUTPATIENT)
Age: 42
End: 2022-03-15

## 2022-03-21 ENCOUNTER — NON-APPOINTMENT (OUTPATIENT)
Age: 42
End: 2022-03-21

## 2022-03-22 ENCOUNTER — NON-APPOINTMENT (OUTPATIENT)
Age: 42
End: 2022-03-22

## 2022-03-26 NOTE — HISTORY OF PRESENT ILLNESS
[FreeTextEntry1] : 40yo P2 presents for f/up left ovarian cyst seen on previous sonogram.  Pt reports suprapubic cramping for last few weeks and clear vaginal discharge.  No odor.  Regular monthly menses.  Pt denies other c/o..\par Sono today reveals stable 3.2cm complex ovarian cyst

## 2022-03-26 NOTE — PHYSICAL EXAM
[Chaperone Present] : A chaperone was present in the examining room during all aspects of the physical examination [Soft] : soft [Non-tender] : non-tender [Labia Majora] : normal [Labia Minora] : normal [Normal] : normal [Uterine Adnexae] : normal

## 2022-03-29 ENCOUNTER — NON-APPOINTMENT (OUTPATIENT)
Age: 42
End: 2022-03-29

## 2022-04-12 ENCOUNTER — NON-APPOINTMENT (OUTPATIENT)
Age: 42
End: 2022-04-12

## 2022-04-26 ENCOUNTER — NON-APPOINTMENT (OUTPATIENT)
Age: 42
End: 2022-04-26

## 2022-05-17 ENCOUNTER — NON-APPOINTMENT (OUTPATIENT)
Age: 42
End: 2022-05-17

## 2022-05-31 ENCOUNTER — NON-APPOINTMENT (OUTPATIENT)
Age: 42
End: 2022-05-31

## 2022-06-21 ENCOUNTER — NON-APPOINTMENT (OUTPATIENT)
Age: 42
End: 2022-06-21

## 2022-07-19 ENCOUNTER — NON-APPOINTMENT (OUTPATIENT)
Age: 42
End: 2022-07-19

## 2022-07-28 ENCOUNTER — NON-APPOINTMENT (OUTPATIENT)
Age: 42
End: 2022-07-28

## 2022-08-04 ENCOUNTER — NON-APPOINTMENT (OUTPATIENT)
Age: 42
End: 2022-08-04

## 2022-08-16 ENCOUNTER — NON-APPOINTMENT (OUTPATIENT)
Age: 42
End: 2022-08-16

## 2022-08-29 NOTE — ED PROVIDER NOTE - TEMPLATE, MLM
PREOPERATIVE HISTORY, PHYSICAL EXAMINATION, AND DISCUSSION    Proposed Procedure: Left reverse total shoulder arthroplasty by Dr. Law on 08/30/22     Chief Complaint   Patient presents with   • Pre-Op Exam     LT reverse total shoulder arthroplasty/ Dr. Law 8/30       JESUS Benitez is a 64 year old female who presents for preoperative exam.    Historian: Self    Patient has given consent to record this visit for documentation in their clinical record.    Preop examination:  Presents for a preoperative visit for left shoulder replacement by Dr. Law scheduled on 08/30/22.   Informs not nervous about surgery. Nervous about walking after the surgery. CT chest from June was normal. Stress test scheduled on 08/25/22.Blood work was normal. Has chronic kidney disease, and her kidney function is reduced which is normal for her. Blood sugar is slightly high; was always normal prior to that. She is always been anemic. Underwent preoperative anesthetic visit. Dr. Sommers, cardiologist will give he preoperative cardiac fitness prior to surgery.    Primary osteoarthritis of left shoulder:  Mentions Dr Mcelroy denied administering the steroid injections and advised for surgery. Uses a walker with a seat to walk. Inquires what can be done post surgery. Informs having wheelchair, it is not useful  as someone needs to push her to get around. Has trouble using a cane with right hand. Has supports to move around in the house. Worried and nervous for walking and how to go for physical therapy.     Right leg weakness:  Has more weakness in right leg. Able to move around with slight discomfort. 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 Inquires about the temporary motorized scooter.    Will be due for booster dose of COVID-19 Jannsen series in October, 22.     Over active bladder:  Was prescribed Oxybutynin. Mentions did not use the medication since a few days due to shortage. Inquires if need to continue medication.  Informs leaking bad in morning after waking up. Wears pads.     Past Medical History:   Diagnosis Date   • Arthritis    • Cerebral infarction (CMS/HCC)     9/2016 - left eye vision changes-TIA   • Congestive cardiac failure (CMS/HCC)    • Fracture     pinky toe and wrist   • Gastroesophageal reflux disease    • H/O therapeutic radiation    • Knee pain    • Malignant neoplasm (CMS/HCC) 2016    stage 2 cervical cancer.  COMPLETED CHEMO AND RADIATION   • Spinal cord compression (CMS/HCC)          Past Surgical History:   Procedure Laterality Date   • Cardiac defibrillator placement  10/08/2020   • Cervical fusion  01/11/2017    C4-C7 CERVICAL LAMINECTOMY AND MASS SCREW FUSION (Dr. Daniel Duke)   • Ir chest port removal     • Ir implantable port vein access Left 07/13/2016   • Ir thrombolysis therapy Bilateral 03/27/2020    22 hour lysis f/u; right lower extremity lysis complete, left lower extremity lysis continues   • Ir thrombolysis therapy Left 03/28/2020    44 hr lysis f/u; completion of therapy; balloon angioplaty to bilateral common iliac artery   • Knee cartilage surgery  03/17/2015    right knee   • Pilonidal cyst drainage     • Tandem and ovoid insertion  09/07/2016    5 treatments planned over 5 weeks   • Tonsillectomy and adenoidectomy     • Tubal ligation  1992       Current Medications:    Current Outpatient Medications   Medication Sig Dispense Refill   • rivaroxaban (Xarelto) 2.5 MG Tab Take 1 tablet by mouth in the morning and 1 tablet in the evening. 180 tablet 1   • gabapentin (NEURONTIN) 300 MG capsule Take 1 capsule by mouth in the morning and 1 capsule in the evening. 60 capsule 0   • DISPENSE Dispense:  Light weight foldable motorized scooter 1 each 0   • carvedilol (COREG) 6.25 MG tablet TAKE 1 TABLET BY MOUTH EVERY 12 HOURS 180 tablet 0   • valsartan (DIOVAN) 80 MG tablet Take 1 tablet by mouth once daily 90 tablet 0   • atorvastatin (LIPITOR) 40 MG tablet Take 1 tablet by mouth nightly 90 tablet  0   • famotidine (PEPCID) 40 MG tablet TAKE 1 TABLET BY MOUTH ONCE DAILY AS NEEDED (ACID REFLUX) 90 tablet 0   • spironolactone (ALDACTONE) 25 MG tablet Take 1 tablet by mouth once daily 90 tablet 2   • oxybutynin (DITROPAN-XL) 5 MG 24 hr tablet Take 1 tablet by mouth daily. 60 tablet 5   • B Complex Vitamins (VITAMIN B COMPLEX PO) Take by mouth daily.     • Cholecalciferol (VITAMIN D3 PO) Take by mouth daily.     • aspirin 81 MG EC tablet Take 1 tablet by mouth daily. 30 tablet 5   • Multiple Vitamin tablet Take 1 tablet by mouth daily.       No current facility-administered medications for this visit.       ALLERGIES:  Codeine and Tramadol      Social History:    Social History     Tobacco Use   • Smoking status: Former Smoker     Packs/day: 0.33     Years: 40.00     Pack years: 13.20     Types: Cigarettes     Quit date: 11/10/2019     Years since quittin.8   • Smokeless tobacco: Never Used   Substance Use Topics   • Alcohol use: Not Currently     Comment: very rare        Family History:    Family History   Problem Relation Age of Onset   • Myocardial Infarction Mother 48   • Heart disease Father 63        alcoholism   • Coronary Artery Disease Father    • Pulmonary embolism Brother    • Cancer Paternal Grandmother         breast   • Myocardial Infarction Paternal Grandfather 63   • Cancer, Breast Paternal Aunt 60        diagnosed at 50   • Cancer Paternal Aunt         breast       Immunization History   Administered Date(s) Administered   • COVID-19 12Y+ Pfizer-BioNtech - Requires Dilution 2022   • COVID-19 18Y+ Onofre/True & True 2021   • COVID-19 Moderna 0.25 mL Booster Vaccine 2022   • Influenza, injectable, quadrivalent 2014   • Influenza, injectable, quadrivalent, preservative-free 2017, 2019, 2021, 10/26/2021   • Pneumococcal Conjugate 13 Valent Vacc (Prevnar 13) 2022       ROS   Constitutional:  No fever  HENT:  No runny nose, congestion, sinus  issue, earache, ear drainage  Respiratory:  No wheezing, coughing, shortness of breath  Cardiovascular: No chest pain, pressure, palpitations.  Gastrointestinal:  No diarrhea, blood in stool, vomiting, constipation, heartburn  Genitourinary:  Slight urge incontinence. No bladder pain, blood in urine      Physical Exam  Constitutional: alert, in no acute distress and current vital signs reviewed. Elevated BMI  Head and Face: atraumatic, no deformities, normocephalic, normal facies.  Eyes: no discharge, normal conjunctiva, no eyelid swelling, no ptosis and the sclerae were normal. Pupils equal, round and reactive to light and accommodation, conjugate gaze and extraocular movements were intact.   ENT: normal appearing outer ear, normal appearing nose. Examination of the tympanic membrane showed normal landmarks, normal appearing external canal. Nasal mucosa moist and pink, no nasal discharge. Normal lips. Oral mucosa pink and moist, no oral lesions.   Neck: normal appearing neck, supple neck and no mass was seen. Thyroid not enlarged and no thyroid nodules.   Lymphatic: no lymphadenopathy.   Pulmonary: no respiratory distress, normal respiratory rate and effort and no accessory muscle use. Breath sounds clear to auscultation bilaterally.   Cardiovascular: normal rate, no murmurs were heard, regular rhythm, normal S1 and normal S2. Edema was not present in the lower extremities.   Abdomen: soft, nontender, nondistended, normal bowel sounds and no abdominal mass. No hepatomegaly and no splenomegaly. No umbilical hernia was discovered.   Musculoskeletal: normal gait. No musculoskeletal erythema was seen, no joint swelling seen and no joint tenderness was elicited. No scoliosis. Normal range of motion.  Neurologic: cranial nerves grossly intact. No sensory deficits noted. No coordination deficits. Normal gait.   Psychiatric: oriented to person, oriented to place and oriented to time. Alert and awake, interactive and  mood/affect were appropriate. Judgment not impaired. Normal attention span. Short term memory intact.   Skin, Hair, Nails: normal skin color and pigmentation and no rash. No foot ulcers and no skin ulcer was seen. Normal skin turgor. No clubbing or cyanosis of the fingernails.     Assessment/Plan:  Eli was seen today for pre-op exam.    Diagnoses and all orders for this visit:    Right leg weakness  -     SERVICE TO PHYSICAL THERAPY  -     Discontinue: DISPENSE; Dispense:  Light weight foldable motorized scooter  -     DISPENSE; Dispense:  Light weight foldable motorized scooter    Primary osteoarthritis of left shoulder    Preop examination    Congestive heart failure, unspecified HF chronicity, unspecified heart failure type (CMS/HCC)  -     Discontinue: DISPENSE; Dispense:  Light weight foldable motorized scooter  -     DISPENSE; Dispense:  Light weight foldable motorized scooter    Intervertebral cervical disc disorder with myelopathy, cervical region  -     Discontinue: DISPENSE; Dispense:  Light weight foldable motorized scooter  -     DISPENSE; Dispense:  Light weight foldable motorized scooter    PAD (peripheral artery disease) (CMS/HCC)  -     Discontinue: DISPENSE; Dispense:  Light weight foldable motorized scooter  -     DISPENSE; Dispense:  Light weight foldable motorized scooter    Preop examination:  Post surgical complications explained given the history of cardiomyopathy.  Henceforth, stress test recommended. Benefits explained.  Not an invasive surgery, not a huge risk of bleeding. Assurance provided.  Explained about risk of infections after surgery.  Cardiologist will decide for blood thinner medications before the surgery.  Advised to inform if anything needed.       Primary osteoarthritis of left shoulder:  Explained using cane can be safer than using walker.  Advised to rearrange house and keep things at minimal reaching distance.  Does not need an inpatient rehab. Assurance  provided.  Advised to have someone around to help for the commute.  Will try to order, informed not sure if the scooter will be covered in insurance.    Right leg weakness:  Ordered motorized scooter.    Over active bladder:  Recommended Oxybutynin (Ditropan-xl) 5 mg two tablets instead of one daily. Prescription updated.      Pre-OP plan:   Pt's chronic conditions appear to be medially optimized for this surgery. Provided her stress test is normal, I see no contraindications to surgery. Thank you for this consult.    Send copies to referring physician and pre-surgical team.      Medical compliance with plan discussed and risks of non-compliance reviewed , Patient education completed on disease process, etiology and prognosis , Patient expresses understanding of the plan , Proper usage and side effects of medication reviewed and discussed, Refer to orders and Return to clinic as clinically indicated as discussed with Patient  who verbalized understanding of & agreement with the plan.    Total time spent with patient was 23 minutes, of which more than 50% was spent counseling patient.    I,  Dr. Verenice Maloney, have created a visit summary document based on the audio recording between Dr. Bhakti Kang MD and this patient for the physician to review, edit as needed, and authenticate.    Creation Date: 8/12/2022      I have reviewed and edited the visit summary above and attest that it is accurate.    Bhakti Kang MD     Addendum:    Stress test normal, no contraindications to surgery.    Bhakti Kang MD  8/29/2022          Abdominal Pain, N/V/D

## 2022-09-06 ENCOUNTER — NON-APPOINTMENT (OUTPATIENT)
Age: 42
End: 2022-09-06

## 2022-09-20 ENCOUNTER — NON-APPOINTMENT (OUTPATIENT)
Age: 42
End: 2022-09-20

## 2022-10-04 ENCOUNTER — NON-APPOINTMENT (OUTPATIENT)
Age: 42
End: 2022-10-04

## 2022-10-20 ENCOUNTER — NON-APPOINTMENT (OUTPATIENT)
Age: 42
End: 2022-10-20

## 2022-10-25 ENCOUNTER — NON-APPOINTMENT (OUTPATIENT)
Age: 42
End: 2022-10-25

## 2022-11-08 ENCOUNTER — NON-APPOINTMENT (OUTPATIENT)
Age: 42
End: 2022-11-08

## 2022-11-22 ENCOUNTER — NON-APPOINTMENT (OUTPATIENT)
Age: 42
End: 2022-11-22

## 2022-11-28 ENCOUNTER — NON-APPOINTMENT (OUTPATIENT)
Age: 42
End: 2022-11-28

## 2022-12-08 ENCOUNTER — NON-APPOINTMENT (OUTPATIENT)
Age: 42
End: 2022-12-08

## 2022-12-27 ENCOUNTER — NON-APPOINTMENT (OUTPATIENT)
Age: 42
End: 2022-12-27

## 2023-01-05 ENCOUNTER — NON-APPOINTMENT (OUTPATIENT)
Age: 43
End: 2023-01-05

## 2023-01-12 ENCOUNTER — NON-APPOINTMENT (OUTPATIENT)
Age: 43
End: 2023-01-12

## 2023-01-13 DIAGNOSIS — Z12.39 ENCOUNTER FOR OTHER SCREENING FOR MALIGNANT NEOPLASM OF BREAST: ICD-10-CM

## 2023-01-13 DIAGNOSIS — R92.2 INCONCLUSIVE MAMMOGRAM: ICD-10-CM

## 2023-01-20 ENCOUNTER — APPOINTMENT (OUTPATIENT)
Dept: OBGYN | Facility: CLINIC | Age: 43
End: 2023-01-20
Payer: COMMERCIAL

## 2023-01-20 ENCOUNTER — ASOB RESULT (OUTPATIENT)
Age: 43
End: 2023-01-20

## 2023-01-20 PROCEDURE — 76830 TRANSVAGINAL US NON-OB: CPT

## 2023-02-15 ENCOUNTER — APPOINTMENT (OUTPATIENT)
Dept: OBGYN | Facility: CLINIC | Age: 43
End: 2023-02-15
Payer: COMMERCIAL

## 2023-02-15 VITALS
DIASTOLIC BLOOD PRESSURE: 70 MMHG | HEIGHT: 64 IN | BODY MASS INDEX: 28.17 KG/M2 | SYSTOLIC BLOOD PRESSURE: 110 MMHG | HEART RATE: 86 BPM | WEIGHT: 165 LBS

## 2023-02-15 PROCEDURE — 99396 PREV VISIT EST AGE 40-64: CPT

## 2023-02-20 LAB
CYTOLOGY CVX/VAG DOC THIN PREP: NORMAL
HPV HIGH+LOW RISK DNA PNL CVX: NOT DETECTED

## 2023-03-02 ENCOUNTER — NON-APPOINTMENT (OUTPATIENT)
Age: 43
End: 2023-03-02

## 2023-04-27 ENCOUNTER — NON-APPOINTMENT (OUTPATIENT)
Age: 43
End: 2023-04-27

## 2023-05-31 ENCOUNTER — LABORATORY RESULT (OUTPATIENT)
Age: 43
End: 2023-05-31

## 2023-05-31 ENCOUNTER — APPOINTMENT (OUTPATIENT)
Dept: INTERNAL MEDICINE | Facility: CLINIC | Age: 43
End: 2023-05-31
Payer: COMMERCIAL

## 2023-05-31 VITALS — SYSTOLIC BLOOD PRESSURE: 115 MMHG | DIASTOLIC BLOOD PRESSURE: 60 MMHG | HEART RATE: 88 BPM

## 2023-05-31 VITALS
WEIGHT: 163 LBS | TEMPERATURE: 98.9 F | HEIGHT: 64 IN | DIASTOLIC BLOOD PRESSURE: 74 MMHG | SYSTOLIC BLOOD PRESSURE: 130 MMHG | OXYGEN SATURATION: 98 % | HEART RATE: 108 BPM | BODY MASS INDEX: 27.83 KG/M2

## 2023-05-31 DIAGNOSIS — R42 DIZZINESS AND GIDDINESS: ICD-10-CM

## 2023-05-31 PROCEDURE — 99213 OFFICE O/P EST LOW 20 MIN: CPT

## 2023-05-31 NOTE — HISTORY OF PRESENT ILLNESS
[de-identified] : 41 y/o female here for f/u\par \par Last seen here about 3 years ago\par \par c/o dizziness episodes\par Can happen when she is tired or overworked\par frequently happen when she enters the doctor's office and then resolve\par Varied in length\par start off with a vertigo like sensation and then she feels dizzy and anxious\par Recent CPE with her current provider in March and labs were done,\par Reports labs were normal\par \par Had Stress Echo in Jan 2020 - normal

## 2023-05-31 NOTE — ASSESSMENT
[FreeTextEntry1] : 41 y/o female\par \par Dizziness episodes\par Today's episode self limited with rest\par worse with stress, increased activities and trying to do too much\par Normal cardiac workup in 2020\par Reports recent blood work normal\par repeated labs today\par \par Stressors remain high\par Completed talk therapy\par Encouraged her to resume her stress reducing activities - avoid overscheduling and rest when needed\par \par \par HCM:\par GYN March 2023, normal pap\par Mammo Feb 2022\par Metabolic Screening: recent lipids noraml

## 2023-06-01 DIAGNOSIS — D50.9 IRON DEFICIENCY ANEMIA, UNSPECIFIED: ICD-10-CM

## 2023-06-01 LAB
ALBUMIN SERPL ELPH-MCNC: 4.4 G/DL
ALP BLD-CCNC: 60 U/L
ALT SERPL-CCNC: 12 U/L
ANION GAP SERPL CALC-SCNC: 11 MMOL/L
AST SERPL-CCNC: 22 U/L
BILIRUB SERPL-MCNC: 0.5 MG/DL
BUN SERPL-MCNC: 11 MG/DL
CALCIUM SERPL-MCNC: 9.7 MG/DL
CHLORIDE SERPL-SCNC: 104 MMOL/L
CO2 SERPL-SCNC: 23 MMOL/L
CREAT SERPL-MCNC: 0.76 MG/DL
EGFR: 101 ML/MIN/1.73M2
ESTIMATED AVERAGE GLUCOSE: 114 MG/DL
GLUCOSE SERPL-MCNC: 95 MG/DL
HBA1C MFR BLD HPLC: 5.6 %
POTASSIUM SERPL-SCNC: 4.4 MMOL/L
PROT SERPL-MCNC: 7.2 G/DL
SODIUM SERPL-SCNC: 139 MMOL/L
TSH SERPL-ACNC: 1.83 UIU/ML

## 2023-06-02 PROBLEM — D50.9 IRON DEFICIENCY ANEMIA: Status: ACTIVE | Noted: 2023-06-02

## 2023-08-17 NOTE — PHYSICAL EXAM
[Awake] : awake [Alert] : alert [Soft] : soft [Oriented x3] : oriented to person, place, and time [Normal] : uterus [No Bleeding] : there was no active vaginal bleeding [Uterine Adnexae] : were not tender and not enlarged [LAD] : no lymphadenopathy [Acute Distress] : no acute distress [Thyroid Nodule] : no thyroid nodule [Goiter] : no goiter [Mass] : no breast mass [Nipple Discharge] : no nipple discharge [Axillary LAD] : no axillary lymphadenopathy [Tender] : non tender mild

## 2023-10-12 ENCOUNTER — NON-APPOINTMENT (OUTPATIENT)
Age: 43
End: 2023-10-12

## 2023-11-09 ENCOUNTER — NON-APPOINTMENT (OUTPATIENT)
Age: 43
End: 2023-11-09

## 2024-01-23 NOTE — PHYSICAL EXAM
[No Acute Distress] : no acute distress [Normal Sclera/Conjunctiva] : normal sclera/conjunctiva [No Respiratory Distress] : no respiratory distress  [Clear to Auscultation] : lungs were clear to auscultation bilaterally [Normal Rate] : normal rate  [Regular Rhythm] : with a regular rhythm [No Edema] : there was no peripheral edema [Soft] : abdomen soft [Non Tender] : non-tender [de-identified] : pale at first, color returned to normal operating room

## 2024-01-25 ENCOUNTER — NON-APPOINTMENT (OUTPATIENT)
Age: 44
End: 2024-01-25

## 2024-03-11 ENCOUNTER — NON-APPOINTMENT (OUTPATIENT)
Age: 44
End: 2024-03-11

## 2024-04-30 ENCOUNTER — NON-APPOINTMENT (OUTPATIENT)
Age: 44
End: 2024-04-30

## 2024-05-01 ENCOUNTER — APPOINTMENT (OUTPATIENT)
Dept: OBGYN | Facility: CLINIC | Age: 44
End: 2024-05-01

## 2024-05-02 ENCOUNTER — ASOB RESULT (OUTPATIENT)
Age: 44
End: 2024-05-02

## 2024-05-02 ENCOUNTER — APPOINTMENT (OUTPATIENT)
Dept: OBGYN | Facility: CLINIC | Age: 44
End: 2024-05-02
Payer: COMMERCIAL

## 2024-05-02 VITALS
HEIGHT: 64 IN | WEIGHT: 178 LBS | BODY MASS INDEX: 30.39 KG/M2 | SYSTOLIC BLOOD PRESSURE: 117 MMHG | DIASTOLIC BLOOD PRESSURE: 75 MMHG | HEART RATE: 91 BPM

## 2024-05-02 DIAGNOSIS — N83.202 UNSPECIFIED OVARIAN CYST, LEFT SIDE: ICD-10-CM

## 2024-05-02 DIAGNOSIS — Z01.419 ENCOUNTER FOR GYNECOLOGICAL EXAMINATION (GENERAL) (ROUTINE) W/OUT ABNORMAL FINDINGS: ICD-10-CM

## 2024-05-02 PROCEDURE — 76830 TRANSVAGINAL US NON-OB: CPT

## 2024-05-02 PROCEDURE — 99396 PREV VISIT EST AGE 40-64: CPT

## 2024-05-02 PROCEDURE — 96127 BRIEF EMOTIONAL/BEHAV ASSMT: CPT

## 2024-05-02 PROCEDURE — 99459 PELVIC EXAMINATION: CPT

## 2024-05-10 PROBLEM — N83.202 LEFT OVARIAN CYST: Status: ACTIVE | Noted: 2018-05-04

## 2024-05-10 NOTE — PHYSICAL EXAM
[No Discharge] : no discharge [Chaperone Present] : A chaperone was present in the examining room during all aspects of the physical examination [Appropriately responsive] : appropriately responsive [Alert] : alert [No Acute Distress] : no acute distress [No Lymphadenopathy] : no lymphadenopathy [Soft] : soft [Non-tender] : non-tender [Non-distended] : non-distended [No HSM] : No HSM [No Lesions] : no lesions [No Mass] : no mass [Oriented x3] : oriented x3 [Examination Of The Breasts] : a normal appearance [No Masses] : no breast masses were palpable [Labia Majora] : normal [Labia Minora] : normal [Normal] : normal [Uterine Adnexae] : normal [80183] : A chaperone was present during the pelvic exam. [FreeTextEntry2] : FLORY Florez

## 2024-05-10 NOTE — PHYSICAL EXAM
[No Discharge] : no discharge [Chaperone Present] : A chaperone was present in the examining room during all aspects of the physical examination [Appropriately responsive] : appropriately responsive [Alert] : alert [No Acute Distress] : no acute distress [No Lymphadenopathy] : no lymphadenopathy [Soft] : soft [Non-tender] : non-tender [Non-distended] : non-distended [No HSM] : No HSM [No Lesions] : no lesions [No Mass] : no mass [Oriented x3] : oriented x3 [Examination Of The Breasts] : a normal appearance [No Masses] : no breast masses were palpable [Labia Majora] : normal [Labia Minora] : normal [Normal] : normal [Uterine Adnexae] : normal [17628] : A chaperone was present during the pelvic exam. [FreeTextEntry2] : FLORY Florez

## 2024-05-10 NOTE — HISTORY OF PRESENT ILLNESS
[Vasectomy (partner)] : has a partner with a vasectomy [Y] : Patient is sexually active [FreeTextEntry1] : 43 year old P2 LMP: presents for annual gyn visit. Pt feels well and has no complaints. She has normal menses. She denies intermenstrual bleeding, itching, burning, or abnormal discharge.  Pelvic sono today done to f/up left ovarian cyst reveals a 2.6cm dermoid-like cyst on left ovary.  Small uterine fibroids.  Pt denies pelvic pain     [Mammogramdate] : 2024 [PapSmeardate] : 02/2023

## 2024-05-10 NOTE — DISCUSSION/SUMMARY
[FreeTextEntry1] : This note was written by Keshia Palacio on 05/02/2024 actively solely Gemma Maya M.D.   All medical record entries made by this scribe at my, Gemma Maya M.D direction and personally dictated by me on 05/02/2024 . I have personally reviewed the chart and agree that the record reflects my personal performance of the history, physical exam, assessment, and plan.

## 2024-05-24 ENCOUNTER — APPOINTMENT (OUTPATIENT)
Dept: OTOLARYNGOLOGY | Facility: CLINIC | Age: 44
End: 2024-05-24

## 2024-07-02 ENCOUNTER — NON-APPOINTMENT (OUTPATIENT)
Age: 44
End: 2024-07-02

## 2024-08-03 ENCOUNTER — NON-APPOINTMENT (OUTPATIENT)
Age: 44
End: 2024-08-03

## 2024-08-14 ENCOUNTER — APPOINTMENT (OUTPATIENT)
Dept: ORTHOPEDIC SURGERY | Facility: CLINIC | Age: 44
End: 2024-08-14
Payer: COMMERCIAL

## 2024-08-14 VITALS — WEIGHT: 178 LBS | BODY MASS INDEX: 30.39 KG/M2 | HEIGHT: 64 IN

## 2024-08-14 DIAGNOSIS — S61.210A LACERATION W/OUT FOREIGN BODY OF RIGHT INDEX FINGER W/OUT DAMAGE TO NAIL, INITIAL ENCOUNTER: ICD-10-CM

## 2024-08-14 PROCEDURE — 99203 OFFICE O/P NEW LOW 30 MIN: CPT

## 2024-08-14 NOTE — ASSESSMENT
[FreeTextEntry1] : The patient was advised of the diagnosis. The natural history of the pathology was explained in full to the patient in layman's terms. All questions were answered. The risks and benefits of surgical and non-surgical treatment alternatives were explained in full to the patient.  PRN

## 2024-08-14 NOTE — HISTORY OF PRESENT ILLNESS
[0] : 0 [de-identified] : 8/14/24: Pt reports graphite in finger in 6th grade.  Pt cut her right index finger on a razor on 8/3/24.  Pt has no pain. [FreeTextEntry1] : RT hand middle finger [FreeTextEntry5] : RT hand middle finger

## 2024-12-02 ENCOUNTER — APPOINTMENT (OUTPATIENT)
Dept: OBGYN | Facility: CLINIC | Age: 44
End: 2024-12-02

## 2025-01-28 ENCOUNTER — APPOINTMENT (OUTPATIENT)
Dept: OBGYN | Facility: CLINIC | Age: 45
End: 2025-01-28

## 2025-02-18 ENCOUNTER — ASOB RESULT (OUTPATIENT)
Age: 45
End: 2025-02-18

## 2025-02-18 ENCOUNTER — APPOINTMENT (OUTPATIENT)
Dept: OBGYN | Facility: CLINIC | Age: 45
End: 2025-02-18
Payer: COMMERCIAL

## 2025-02-18 PROCEDURE — 76830 TRANSVAGINAL US NON-OB: CPT

## 2025-02-18 PROCEDURE — 76857 US EXAM PELVIC LIMITED: CPT | Mod: 59

## 2025-02-19 ENCOUNTER — APPOINTMENT (OUTPATIENT)
Dept: OBGYN | Facility: CLINIC | Age: 45
End: 2025-02-19
Payer: COMMERCIAL

## 2025-02-19 DIAGNOSIS — N39.3 STRESS INCONTINENCE (FEMALE) (MALE): ICD-10-CM

## 2025-02-19 LAB
BILIRUB UR QL STRIP: NEGATIVE
CLARITY UR: CLEAR
COLLECTION METHOD: NORMAL
GLUCOSE UR-MCNC: NEGATIVE
HCG UR QL: 0.2 EU/DL
HGB UR QL STRIP.AUTO: NEGATIVE
KETONES UR-MCNC: NEGATIVE
LEUKOCYTE ESTERASE UR QL STRIP: NORMAL
NITRITE UR QL STRIP: NEGATIVE
PH UR STRIP: 6
PROT UR STRIP-MCNC: NEGATIVE
SP GR UR STRIP: 1.01

## 2025-02-19 PROCEDURE — 99211 OFF/OP EST MAY X REQ PHY/QHP: CPT

## 2025-02-20 LAB — BACTERIA UR CULT: NORMAL

## 2025-04-28 ENCOUNTER — NON-APPOINTMENT (OUTPATIENT)
Age: 45
End: 2025-04-28

## 2025-04-30 ENCOUNTER — TRANSCRIPTION ENCOUNTER (OUTPATIENT)
Age: 45
End: 2025-04-30

## 2025-05-08 ENCOUNTER — NON-APPOINTMENT (OUTPATIENT)
Age: 45
End: 2025-05-08

## 2025-05-12 ENCOUNTER — TRANSCRIPTION ENCOUNTER (OUTPATIENT)
Age: 45
End: 2025-05-12

## 2025-05-12 DIAGNOSIS — N64.89 OTHER SPECIFIED DISORDERS OF BREAST: ICD-10-CM

## 2025-05-28 ENCOUNTER — NON-APPOINTMENT (OUTPATIENT)
Age: 45
End: 2025-05-28

## 2025-09-12 ENCOUNTER — APPOINTMENT (OUTPATIENT)
Dept: OBGYN | Facility: CLINIC | Age: 45
End: 2025-09-12